# Patient Record
Sex: FEMALE | Race: WHITE | NOT HISPANIC OR LATINO
[De-identification: names, ages, dates, MRNs, and addresses within clinical notes are randomized per-mention and may not be internally consistent; named-entity substitution may affect disease eponyms.]

---

## 2017-01-11 ENCOUNTER — RX RENEWAL (OUTPATIENT)
Age: 37
End: 2017-01-11

## 2017-03-03 ENCOUNTER — LABORATORY RESULT (OUTPATIENT)
Age: 37
End: 2017-03-03

## 2017-04-18 ENCOUNTER — RESULT REVIEW (OUTPATIENT)
Age: 37
End: 2017-04-18

## 2017-04-24 ENCOUNTER — RESULT REVIEW (OUTPATIENT)
Age: 37
End: 2017-04-24

## 2017-05-09 ENCOUNTER — EMERGENCY (EMERGENCY)
Facility: HOSPITAL | Age: 37
LOS: 1 days | Discharge: HOME | End: 2017-05-09
Attending: EMERGENCY MEDICINE | Admitting: EMERGENCY MEDICINE
Payer: COMMERCIAL

## 2017-05-09 VITALS
DIASTOLIC BLOOD PRESSURE: 75 MMHG | HEART RATE: 81 BPM | SYSTOLIC BLOOD PRESSURE: 136 MMHG | RESPIRATION RATE: 16 BRPM | OXYGEN SATURATION: 100 %

## 2017-05-09 VITALS — TEMPERATURE: 98 F

## 2017-05-09 PROCEDURE — 99284 EMERGENCY DEPT VISIT MOD MDM: CPT

## 2017-05-09 RX ORDER — IBUPROFEN 200 MG
600 TABLET ORAL ONCE
Qty: 0 | Refills: 0 | Status: COMPLETED | OUTPATIENT
Start: 2017-05-09 | End: 2017-05-09

## 2017-05-09 RX ADMIN — Medication 600 MILLIGRAM(S): at 15:08
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
## 2017-05-09 NOTE — ED PROVIDER NOTE - MUSCULOSKELETAL, MLM
Spine appears normal, range of motion is not limited, no midline back or neck tenderness. +ttp of upper parathoracic region along the trapezius.
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
## 2017-06-09 ENCOUNTER — TRANSCRIPTION ENCOUNTER (OUTPATIENT)
Age: 37
End: 2017-06-09

## 2018-03-22 ENCOUNTER — APPOINTMENT (OUTPATIENT)
Dept: SPINE | Facility: CLINIC | Age: 38
End: 2018-03-22
Payer: COMMERCIAL

## 2018-03-22 VITALS
DIASTOLIC BLOOD PRESSURE: 78 MMHG | HEART RATE: 85 BPM | RESPIRATION RATE: 18 BRPM | SYSTOLIC BLOOD PRESSURE: 126 MMHG | TEMPERATURE: 98.6 F

## 2018-03-22 DIAGNOSIS — Z87.891 PERSONAL HISTORY OF NICOTINE DEPENDENCE: ICD-10-CM

## 2018-03-22 DIAGNOSIS — Z78.9 OTHER SPECIFIED HEALTH STATUS: ICD-10-CM

## 2018-03-22 PROCEDURE — 99203 OFFICE O/P NEW LOW 30 MIN: CPT

## 2018-04-06 ENCOUNTER — APPOINTMENT (OUTPATIENT)
Dept: DERMATOLOGY | Facility: CLINIC | Age: 38
End: 2018-04-06

## 2018-10-02 ENCOUNTER — TRANSCRIPTION ENCOUNTER (OUTPATIENT)
Age: 38
End: 2018-10-02

## 2019-03-21 ENCOUNTER — APPOINTMENT (OUTPATIENT)
Dept: MAMMOGRAPHY | Facility: CLINIC | Age: 39
End: 2019-03-21
Payer: COMMERCIAL

## 2019-03-21 ENCOUNTER — OUTPATIENT (OUTPATIENT)
Dept: OUTPATIENT SERVICES | Facility: HOSPITAL | Age: 39
LOS: 1 days | End: 2019-03-21
Payer: COMMERCIAL

## 2019-03-21 DIAGNOSIS — Z00.8 ENCOUNTER FOR OTHER GENERAL EXAMINATION: ICD-10-CM

## 2019-03-21 PROCEDURE — 77063 BREAST TOMOSYNTHESIS BI: CPT | Mod: 26

## 2019-03-21 PROCEDURE — 77067 SCR MAMMO BI INCL CAD: CPT

## 2019-03-21 PROCEDURE — 77063 BREAST TOMOSYNTHESIS BI: CPT

## 2019-03-21 PROCEDURE — 77067 SCR MAMMO BI INCL CAD: CPT | Mod: 26

## 2019-03-28 ENCOUNTER — APPOINTMENT (OUTPATIENT)
Dept: MAMMOGRAPHY | Facility: CLINIC | Age: 39
End: 2019-03-28

## 2019-03-28 ENCOUNTER — APPOINTMENT (OUTPATIENT)
Dept: ULTRASOUND IMAGING | Facility: CLINIC | Age: 39
End: 2019-03-28
Payer: COMMERCIAL

## 2019-03-28 ENCOUNTER — OUTPATIENT (OUTPATIENT)
Dept: OUTPATIENT SERVICES | Facility: HOSPITAL | Age: 39
LOS: 1 days | End: 2019-03-28
Payer: COMMERCIAL

## 2019-03-28 DIAGNOSIS — R92.2 INCONCLUSIVE MAMMOGRAM: ICD-10-CM

## 2019-03-28 DIAGNOSIS — Z00.8 ENCOUNTER FOR OTHER GENERAL EXAMINATION: ICD-10-CM

## 2019-03-28 PROCEDURE — 77065 DX MAMMO INCL CAD UNI: CPT

## 2019-03-28 PROCEDURE — 76642 ULTRASOUND BREAST LIMITED: CPT

## 2019-03-28 PROCEDURE — 76642 ULTRASOUND BREAST LIMITED: CPT | Mod: 26,RT

## 2019-03-28 PROCEDURE — G0279: CPT | Mod: 26

## 2019-03-28 PROCEDURE — 77065 DX MAMMO INCL CAD UNI: CPT | Mod: 26,RT

## 2019-03-28 PROCEDURE — G0279: CPT

## 2019-04-02 ENCOUNTER — RESULT REVIEW (OUTPATIENT)
Age: 39
End: 2019-04-02

## 2019-04-02 ENCOUNTER — OUTPATIENT (OUTPATIENT)
Dept: OUTPATIENT SERVICES | Facility: HOSPITAL | Age: 39
LOS: 1 days | End: 2019-04-02
Payer: COMMERCIAL

## 2019-04-02 ENCOUNTER — APPOINTMENT (OUTPATIENT)
Dept: MAMMOGRAPHY | Facility: CLINIC | Age: 39
End: 2019-04-02
Payer: COMMERCIAL

## 2019-04-02 DIAGNOSIS — R92.8 OTHER ABNORMAL AND INCONCLUSIVE FINDINGS ON DIAGNOSTIC IMAGING OF BREAST: ICD-10-CM

## 2019-04-02 PROCEDURE — 77065 DX MAMMO INCL CAD UNI: CPT | Mod: 26,RT

## 2019-04-02 PROCEDURE — A4648: CPT

## 2019-04-02 PROCEDURE — 88305 TISSUE EXAM BY PATHOLOGIST: CPT

## 2019-04-02 PROCEDURE — 88305 TISSUE EXAM BY PATHOLOGIST: CPT | Mod: 26

## 2019-04-02 PROCEDURE — 77065 DX MAMMO INCL CAD UNI: CPT

## 2019-04-02 PROCEDURE — 19081 BX BREAST 1ST LESION STRTCTC: CPT | Mod: RT

## 2019-04-02 PROCEDURE — 19081 BX BREAST 1ST LESION STRTCTC: CPT

## 2019-04-16 ENCOUNTER — TRANSCRIPTION ENCOUNTER (OUTPATIENT)
Age: 39
End: 2019-04-16

## 2019-08-01 ENCOUNTER — APPOINTMENT (OUTPATIENT)
Dept: PLASTIC SURGERY | Facility: CLINIC | Age: 39
End: 2019-08-01
Payer: COMMERCIAL

## 2019-08-01 VITALS — WEIGHT: 158 LBS | BODY MASS INDEX: 31.02 KG/M2 | HEIGHT: 60 IN

## 2019-08-01 DIAGNOSIS — Z78.9 OTHER SPECIFIED HEALTH STATUS: ICD-10-CM

## 2019-08-01 PROCEDURE — 99203 OFFICE O/P NEW LOW 30 MIN: CPT

## 2019-08-01 NOTE — ASSESSMENT
[FreeTextEntry1] : Patient is a 38-year-old female who presents with an enlarging mass on her left cheek. Patient was evaluated by Dermatology who recommended excision.\par \par I discussed the findings with patient, and treatment options. Agree with recommendation to excise the mass as it may continue growing or may get infected.  We discussed the planned procedure including alternatives, risks and potential complications which include but are not limited to infection, bleeding, recurrence, seroma, asymmetry, deformity, scarring, paresthesia, wound dehiscence. All questions were answered, and patient would like to proceed.\par \par We will schedule for surgery as an office procedure at our earliest mutual convenient time.

## 2019-08-01 NOTE — REASON FOR VISIT
[Consultation] : a consultation visit [FreeTextEntry1] : Pt presents here for an initial consultation at the request of Dr. Ham. Pt states she noticed cyst appear under left eye approx. 1 yr ago. Pt states it has been growing in size overtime. Pt states she has noticed discharge w/ a foul odor. Pt denies any bx, or imaging. Pt denies any pain, discomfort, irritation, or itchiness.

## 2019-08-01 NOTE — PHYSICAL EXAM
[de-identified] : The patient is ambulatory, well groomed, with no signs of cachexia.  [de-identified] : Normocephalic, atraumatic  [de-identified] : No conjuctival erythema, hyperemia, or discharge; No ectropion, entropion, lagophthalmos, or lid malposition\par Both pupils are equal, round, & reactive to light  [de-identified] : There are no masses, scars, or lesions of the external ear.  The external nose is midline with no scars or masses.  \par Gross hearing is intact bilaterally (finger rub).\par The nasal mucosa has no edema, lesions, or signs of desiccation. \par The lips and gums have no masses, lesions, friability or edema.  [de-identified] : The neck is soft without edema, masses, or crepitus.  The trachea is midline.  \par There is no thyromegaly or palpable thyroid nodules.  [de-identified] : The patient has no tachypnea or use of accessory respiratory muscles.  [de-identified] : The extremities have no swelling, tenderness, or varicosities.  \par On palpation, the extremities are warm with palpable pedal pulses  [de-identified] : There is no abdominal wall tenderness or masses with palpation.   \par No abdominal wall hernias are noted.  [de-identified] : The patient has normal gait and station.\par With inspection and palpation of digits and nails, there is no clubbing, ischemia, or infections noted.  [de-identified] : CN 2 - 12 are intact\par No sensory disturbances are noted (e.g., by touch)  [de-identified] : On examniation of the face, on the left medial cheek, under the lower eyelid/cheek junction, there is a palpable mass 1.5" with a punctum noted, no erythema or drainage, + tenderness\par There are no focal areas of alopecia in the scalp, eyebrows, face, chest, & extremities.\par There are no rashes of the head, neck, chest, abdomen, back, RUE, LUE, RLE, LLE  [de-identified] : The patient is alert and oriented to time, place, and person. \par There is no obvious disorder in mood or affect such as anxiety or agitation.

## 2019-08-01 NOTE — CONSULT LETTER
[Dear  ___] : Dear  [unfilled], [Consult Letter:] : I had the pleasure of evaluating your patient, [unfilled]. [( Thank you for referring [unfilled] for consultation for _____ )] : Thank you for referring [unfilled] for consultation for [unfilled] [Please see my note below.] : Please see my note below. [Sincerely,] : Sincerely, [Consult Closing:] : Thank you very much for allowing me to participate in the care of this patient.  If you have any questions, please do not hesitate to contact me. [FreeTextEntry3] : Tavo Bolaños MD

## 2019-09-05 ENCOUNTER — LABORATORY RESULT (OUTPATIENT)
Age: 39
End: 2019-09-05

## 2019-09-05 ENCOUNTER — APPOINTMENT (OUTPATIENT)
Dept: PLASTIC SURGERY | Facility: CLINIC | Age: 39
End: 2019-09-05
Payer: COMMERCIAL

## 2019-09-05 PROCEDURE — 14040 TIS TRNFR F/C/C/M/N/A/G/H/F: CPT

## 2019-09-05 PROCEDURE — 21011 EXC FACE LES SC <2 CM: CPT

## 2019-09-12 ENCOUNTER — APPOINTMENT (OUTPATIENT)
Dept: PLASTIC SURGERY | Facility: CLINIC | Age: 39
End: 2019-09-12
Payer: COMMERCIAL

## 2019-09-12 PROCEDURE — 99024 POSTOP FOLLOW-UP VISIT: CPT

## 2019-09-17 NOTE — HISTORY OF PRESENT ILLNESS
[FreeTextEntry1] : 40 yo F s/p excisional biopsy of the left cheek mass DOS: 09/05/19. Doing well, denies much discomfort. Dressings fell off. Otherwise no other complaints or concerns; denies fever, sweats, or chills.\par

## 2019-09-17 NOTE — PHYSICAL EXAM
[de-identified] : Left Cheek -\par Incision is well healed, sutures were removed today, no erythema, no gaps, no drainage noted. No sign of active infection.

## 2019-09-17 NOTE — ASSESSMENT
[FreeTextEntry1] : 38 yo F s/p excisional biopsy of the left cheek mass DOS: 09/05/19.\par \par Pt doing well; incision is well healed\par - Pathology results were reviewed w/ the patient today.\par - The patient was encouraged to massage the incision site 2-3 times per day for 8-10 minutes with lotion, vitamin E, or cocoa butter to encourage blood flow and to decrease scar tissue formation.\par - Sun protection recommended.\par - Follow up PRN.

## 2019-10-15 ENCOUNTER — TRANSCRIPTION ENCOUNTER (OUTPATIENT)
Age: 39
End: 2019-10-15

## 2019-11-20 ENCOUNTER — APPOINTMENT (OUTPATIENT)
Dept: CARDIOLOGY | Facility: CLINIC | Age: 39
End: 2019-11-20
Payer: COMMERCIAL

## 2019-11-20 ENCOUNTER — NON-APPOINTMENT (OUTPATIENT)
Age: 39
End: 2019-11-20

## 2019-11-20 VITALS
HEIGHT: 60 IN | BODY MASS INDEX: 32 KG/M2 | OXYGEN SATURATION: 97 % | WEIGHT: 163 LBS | RESPIRATION RATE: 18 BRPM | DIASTOLIC BLOOD PRESSURE: 80 MMHG | SYSTOLIC BLOOD PRESSURE: 120 MMHG | HEART RATE: 85 BPM

## 2019-11-20 DIAGNOSIS — R00.2 PALPITATIONS: ICD-10-CM

## 2019-11-20 DIAGNOSIS — Z82.49 FAMILY HISTORY OF ISCHEMIC HEART DISEASE AND OTHER DISEASES OF THE CIRCULATORY SYSTEM: ICD-10-CM

## 2019-11-20 PROCEDURE — 93000 ELECTROCARDIOGRAM COMPLETE: CPT

## 2019-11-20 PROCEDURE — 99204 OFFICE O/P NEW MOD 45 MIN: CPT

## 2019-11-20 NOTE — HISTORY OF PRESENT ILLNESS
[FreeTextEntry1] : 39 year old woman with a history cervical disease presents for an initial cardiac evaluation. \par \par Recently she was complaining of a midsternal chest pain that was sharp, nonradiating, nonexertional, associated with nausea. The pain persistent for 30 minutes and she went to [facility]. Her workup  was normal and discharged. \par \par She has been complaining of a fluttering that happens 2 times a weeks for seconds, usually happens at rest. \par She has been under more stress recently. Recently she has been stressed and had a panic attack. \par  She   denies any  PND, orthopnea, lower extremity edema, near syncope, syncope, strokelike symptoms. \par She had a been more sedentary given her spinal disease but now is starting to go for [employer].

## 2019-11-20 NOTE — PHYSICAL EXAM
[Well Groomed] : well groomed [General Appearance - In No Acute Distress] : no acute distress [Normal Conjunctiva] : the conjunctiva exhibited no abnormalities [Eyelids - No Xanthelasma] : the eyelids demonstrated no xanthelasmas [Normal Oral Mucosa] : normal oral mucosa [No Oral Pallor] : no oral pallor [No Oral Cyanosis] : no oral cyanosis [Normal Jugular Venous V Waves Present] : normal jugular venous V waves present [Normal Jugular Venous A Waves Present] : normal jugular venous A waves present [No Jugular Venous Tejada A Waves] : no jugular venous tejada A waves [Rhythm Regular] : regular [Normal Rate] : normal [Normal S1] : normal S1 [Normal S2] : normal S2 [No Murmur] : no murmurs heard [Right Carotid Bruit] : no bruit heard over the right carotid [Left Carotid Bruit] : no bruit heard over the left carotid [2+] : left 2+ [Bruit] : no bruit heard [No Pitting Edema] : no pitting edema present [Auscultation Breath Sounds / Voice Sounds] : lungs were clear to auscultation bilaterally [Respiration, Rhythm And Depth] : normal respiratory rhythm and effort [Exaggerated Use Of Accessory Muscles For Inspiration] : no accessory muscle use [Abdomen Soft] : soft [Abdomen Tenderness] : non-tender [Abdomen Mass (___ Cm)] : no abdominal mass palpated [Abnormal Walk] : normal gait [Gait - Sufficient For Exercise Testing] : the gait was sufficient for exercise testing [Nail Clubbing] : no clubbing of the fingernails [Cyanosis, Localized] : no localized cyanosis [Petechial Hemorrhages (___cm)] : no petechial hemorrhages [Skin Color & Pigmentation] : normal skin color and pigmentation [] : no rash [No Venous Stasis] : no venous stasis [Skin Lesions] : no skin lesions [No Skin Ulcers] : no skin ulcer [No Xanthoma] : no  xanthoma was observed [Mood] : the mood was normal [Affect] : the affect was normal [Oriented To Time, Place, And Person] : oriented to person, place, and time [No Anxiety] : not feeling anxious

## 2019-11-20 NOTE — DISCUSSION/SUMMARY
[FreeTextEntry1] : 39 year woman with a history as listed presents for an initial cardiac evaluation. \par Claudia had a bout nonanginal chest. Her EKG did not reveal any significant ischemic changes. \par She will undergo a treadmill exercise stress test to define exercise tolerance, rule out exertional hypertensive responses, assess for exercise induced arrhythmias and rule out ischemia from obstructive CAD. She will get a 2d echo to assess for any  new structural heart disease, changes in valvular and ventricular function. \par Her palpitations appear to be related to possible mild ectopy. If it increases in frequency then I will send her monitoring. \par Exercise and diet counseling was performed in order to reduce her future cardiovascular risk.\par She will have her baseline lab work, including lipids, done prior to the next visit. \par She will followup with me in 12 months or sooner if necessary. \par

## 2019-11-22 ENCOUNTER — NON-APPOINTMENT (OUTPATIENT)
Age: 39
End: 2019-11-22

## 2019-11-22 ENCOUNTER — APPOINTMENT (OUTPATIENT)
Dept: CARDIOLOGY | Facility: CLINIC | Age: 39
End: 2019-11-22
Payer: COMMERCIAL

## 2019-12-03 NOTE — PROCEDURE
[FreeTextEntry2] : Excisional biopsy left cheek mass 2cm ,and local flap <10sqcm [Nl] : None [FreeTextEntry1] : Left cheek mass [FreeTextEntry6] : After the area was prepped and draped, the area was infiltrated with 1%lidocaine with epi. The mass in question was marked. Incision was made around it and the mass was dissected circumferentially with the overlying skin. It measured 2cm. This was sent to pathology.\par \par The wound was then irrigated, and given size and location of the wound, a local flap was marked. The flap was incised and elevated. The flap was then advanced into the area providing a good reconstruction. The flap was then inset with 4-0 monocryl for the dermis and 5-0 nylon for the skin. The total area including the wound was less than 10sq cm. A dressing was applied. Pt tolerated well procedure.\par  [FreeTextEntry7] : left cheek mass

## 2019-12-03 NOTE — REASON FOR VISIT
[Procedure: _________] : a [unfilled] procedure visit [FreeTextEntry1] : excisional biopsy and closure of left cheek mass.

## 2019-12-05 PROCEDURE — 93224 XTRNL ECG REC UP TO 48 HRS: CPT

## 2019-12-09 ENCOUNTER — APPOINTMENT (OUTPATIENT)
Dept: NEUROLOGY | Facility: CLINIC | Age: 39
End: 2019-12-09

## 2019-12-11 ENCOUNTER — APPOINTMENT (OUTPATIENT)
Dept: CARDIOLOGY | Facility: CLINIC | Age: 39
End: 2019-12-11
Payer: COMMERCIAL

## 2019-12-11 PROCEDURE — 93306 TTE W/DOPPLER COMPLETE: CPT

## 2019-12-13 ENCOUNTER — TRANSCRIPTION ENCOUNTER (OUTPATIENT)
Age: 39
End: 2019-12-13

## 2019-12-19 ENCOUNTER — APPOINTMENT (OUTPATIENT)
Dept: CARDIOLOGY | Facility: CLINIC | Age: 39
End: 2019-12-19

## 2020-02-28 ENCOUNTER — APPOINTMENT (OUTPATIENT)
Dept: ORTHOPEDIC SURGERY | Facility: CLINIC | Age: 40
End: 2020-02-28
Payer: COMMERCIAL

## 2020-02-28 DIAGNOSIS — M62.830 MUSCLE SPASM OF BACK: ICD-10-CM

## 2020-02-28 PROCEDURE — 99203 OFFICE O/P NEW LOW 30 MIN: CPT | Mod: 25

## 2020-02-28 PROCEDURE — 20553 NJX 1/MLT TRIGGER POINTS 3/>: CPT | Mod: LT

## 2020-02-28 NOTE — HISTORY OF PRESENT ILLNESS
[de-identified] : Patient is here for left shoulder pain. This has been a chronic issue. She has been able to maintain with PT, exercise, massage therapy, muscle relaxers. She had an exacerbation about a  week ago. She used to receive trigger point injections which would provide relief.

## 2020-02-28 NOTE — PHYSICAL EXAM
[de-identified] : Constitutional: Well-nourished, well-developed, No acute distress\par Respiratory:  Good respiratory effort, no SOB\par Lymphatic: No regional lymphadenopathy, no lymphedema\par Psychiatric: Pleasant and normal affect, alert and oriented x3\par Skin: Clean dry and intact B/L UE/LE\par Musculoskeletal: normal except where as noted in regional exam\par \par Cervical Spine Exam\par APPEARANCE: no marked deformities or malalignment, normal curvature, good posture\par POSITIVE TENDERNESS: + Left upper trapezius, levator scapula, rhomboid major, and rhomboid minor, + spasm noted in the same muscles.\par NONTENDER: no bony midline tenderness.\par ROM: limited in all planes, most notably in flexion and sidebending due to pain\par RESISTIVE TESTING: painful 4/5 resisted ext, bilateral sidebending, rotation and shoulder shrug , 5/5 flexion \par SPECIAL TESTS: neg Spurling's b/l\par Vasc: 2+ radial pulse b/l\par Neuro: C5 - T1 intact to motor, DTRs 2+/4 biceps, triceps, brachioradialis\par Sensation: Intact to light touch throughout b/l UE\par \par Thoracic Spine Exam:\par \par normal curvature and normal alignment. good posture. no midline bony tenderness, + marked spasm and associated tenderness of left paraspinal and periscapular muscles.  ROM mildly limited in sidebending and rotation due to noted spasm\par \par B/L Shoulders:  No asymmetry, malalignment, or swelling, Full ROM, 5/5 strength in flexion/ext, Abd/Add, IR/ER, Joints stable\par B/L Elbows:  No asymmetry, malalignment, or swelling, Full ROM, 5/5 strength in flexion/ext, pronation/supination, Joints stable\par B/L Wrist and Hand:  No asymmetry, malalignment, or swelling, Full ROM, 5/5 strength in wrist and long finger flexion/ext, radial/ulnar deviation, Joints stable\par \par

## 2020-02-28 NOTE — PROCEDURE
[de-identified] : Injection: Trigger Point Injection. \par Indication: Myofascial trigger point of the right quadratus lumborum, iliocostalis, longissimus, and spinalis.  \par \par A discussion was had with the patient regarding this procedure and all questions were answered. All risks, benefits and alternatives were discussed. These included but were not limited to bleeding, infection, allergic reaction, and possibility of pneumothorax. A timeout was done to ensure correct side and location of identified trigger points and pt agreed to the procedure. Alcohol was used to clean the skin. Ethyl chloride spray was then used as a topical anesthetic. A 5cc syringe was filled with 4cc of 0.25% bupivacaine and 1cc of 40mg/ml methylprednisolone. A 25-gauge 1.5" needle was used to inject 2.5cc into the trigger points. A sterile bandage was then applied. The patient tolerated the procedure well and there were no complications.

## 2020-02-28 NOTE — DISCUSSION/SUMMARY
[de-identified] : Discussed findings of today's exam and possible causes of patient's pain.  Educated patient on their most probable diagnosis of left cervicothoracic back pain with myofascial spasm.  Reviewed possible courses of treatment, and we collaboratively decided best course of treatment at this time will include myofascial trigger point cortisone injection today (see procedure note).  Informed the patient that the numbing medicine in today's injection will last for about 4-6 hours. The steroid that was injected will start to work in 1 to 2 days, peak at 1-2 weeks, and may last up to 1-2 months. Patient will continue with massage therapy and home stretching/exercise program as tolerated. May consider formal course of physical therapy she has persisting pain.  Follow up as needed.  Patient appreciates and agrees with current plan.\par \par This note was generated using dragon medical dictation software.  A reasonable effort has been made for proofreading its contents, but typos may still remain.  If there are any questions or points of clarification needed please notify my office.

## 2020-03-09 ENCOUNTER — APPOINTMENT (OUTPATIENT)
Dept: CARDIOLOGY | Facility: CLINIC | Age: 40
End: 2020-03-09

## 2020-04-21 ENCOUNTER — APPOINTMENT (OUTPATIENT)
Dept: NEUROLOGY | Facility: CLINIC | Age: 40
End: 2020-04-21

## 2020-04-25 ENCOUNTER — MESSAGE (OUTPATIENT)
Age: 40
End: 2020-04-25

## 2020-05-06 ENCOUNTER — APPOINTMENT (OUTPATIENT)
Dept: DISASTER EMERGENCY | Facility: CLINIC | Age: 40
End: 2020-05-06

## 2020-05-07 LAB
SARS-COV-2 IGG SERPL IA-ACNC: 0.3 RATIO
SARS-COV-2 IGG SERPL QL IA: NEGATIVE

## 2020-09-15 NOTE — ED PROVIDER NOTE - SKIN, MLM
Skin normal color for race, warm, dry and intact. No evidence of abrasions, lacerations or ecchymosis.
Spouse/Transport

## 2021-04-21 ENCOUNTER — APPOINTMENT (OUTPATIENT)
Dept: DERMATOLOGY | Facility: CLINIC | Age: 41
End: 2021-04-21
Payer: COMMERCIAL

## 2021-04-21 ENCOUNTER — LABORATORY RESULT (OUTPATIENT)
Age: 41
End: 2021-04-21

## 2021-04-21 ENCOUNTER — NON-APPOINTMENT (OUTPATIENT)
Age: 41
End: 2021-04-21

## 2021-04-21 VITALS — WEIGHT: 160 LBS | BODY MASS INDEX: 31.41 KG/M2 | HEIGHT: 60 IN

## 2021-04-21 DIAGNOSIS — L98.8 OTHER SPECIFIED DISORDERS OF THE SKIN AND SUBCUTANEOUS TISSUE: ICD-10-CM

## 2021-04-21 DIAGNOSIS — D22.9 MELANOCYTIC NEVI, UNSPECIFIED: ICD-10-CM

## 2021-04-21 DIAGNOSIS — D48.5 NEOPLASM OF UNCERTAIN BEHAVIOR OF SKIN: ICD-10-CM

## 2021-04-21 PROCEDURE — 11104 PUNCH BX SKIN SINGLE LESION: CPT

## 2021-04-21 PROCEDURE — 99072 ADDL SUPL MATRL&STAF TM PHE: CPT

## 2021-04-21 PROCEDURE — 99203 OFFICE O/P NEW LOW 30 MIN: CPT | Mod: 25

## 2021-04-22 PROBLEM — D22.9 ACQUIRED MELANOCYTIC NEVUS: Status: ACTIVE | Noted: 2021-04-22

## 2021-04-22 PROBLEM — D48.5 NEOPLASM OF UNCERTAIN BEHAVIOR OF SKIN: Status: ACTIVE | Noted: 2021-04-22

## 2021-04-22 PROBLEM — L98.8 SKIN MACULE: Status: ACTIVE | Noted: 2021-04-22

## 2021-04-30 ENCOUNTER — RESULT REVIEW (OUTPATIENT)
Age: 41
End: 2021-04-30

## 2021-05-04 ENCOUNTER — APPOINTMENT (OUTPATIENT)
Dept: DERMATOLOGY | Facility: CLINIC | Age: 41
End: 2021-05-04
Payer: COMMERCIAL

## 2021-05-04 DIAGNOSIS — D23.9 OTHER BENIGN NEOPLASM OF SKIN, UNSPECIFIED: ICD-10-CM

## 2021-05-04 PROCEDURE — 99212 OFFICE O/P EST SF 10 MIN: CPT | Mod: GC

## 2021-05-04 PROCEDURE — 99072 ADDL SUPL MATRL&STAF TM PHE: CPT

## 2021-06-07 ENCOUNTER — RESULT REVIEW (OUTPATIENT)
Age: 41
End: 2021-06-07

## 2021-06-07 ENCOUNTER — APPOINTMENT (OUTPATIENT)
Dept: NEUROLOGY | Facility: CLINIC | Age: 41
End: 2021-06-07
Payer: COMMERCIAL

## 2021-06-07 VITALS
DIASTOLIC BLOOD PRESSURE: 96 MMHG | HEART RATE: 81 BPM | BODY MASS INDEX: 32.59 KG/M2 | WEIGHT: 166 LBS | HEIGHT: 60 IN | SYSTOLIC BLOOD PRESSURE: 152 MMHG

## 2021-06-07 PROCEDURE — 99072 ADDL SUPL MATRL&STAF TM PHE: CPT

## 2021-06-07 PROCEDURE — 99205 OFFICE O/P NEW HI 60 MIN: CPT
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
## 2021-06-07 NOTE — PHYSICAL EXAM
[General Appearance - Alert] : alert [General Appearance - In No Acute Distress] : in no acute distress [Oriented To Time, Place, And Person] : oriented to person, place, and time [Impaired Insight] : insight and judgment were intact [Affect] : the affect was normal [Person] : oriented to person [Place] : oriented to place [Time] : oriented to time [Concentration Intact] : normal concentrating ability [Visual Intact] : visual attention was ~T not ~L decreased [Naming Objects] : no difficulty naming common objects [Repeating Phrases] : no difficulty repeating a phrase [Writing A Sentence] : no difficulty writing a sentence [Fluency] : fluency intact [Comprehension] : comprehension intact [Reading] : reading intact [Past History] : adequate knowledge of personal past history [Date / Time ___ / 5] : date / time [unfilled] / 5 [Place ___ / 5] : place [unfilled] / 5 [Registration ___ / 3] : registration [unfilled] / 3 [Serial Sevens ___/5] : serial sevens [unfilled] / 5 [Naming 2 Objects ___ / 2] : naming two objects [unfilled] / 2 [Repeating a Sentence ___ / 1] : repeating a sentence [unfilled] / 1 [Writing a Sentence ___ / 1] : write sentence [unfilled] / 1 [3-stage Verbal Command ___ / 3] : three-stage verbal command [unfilled] / 3 [Written Command ___ / 1] : written command [unfilled] / 1 [Cranial Nerves Optic (II)] : visual acuity intact bilaterally,  visual fields full to confrontation, pupils equal round and reactive to light [Cranial Nerves Oculomotor (III)] : extraocular motion intact [Cranial Nerves Trigeminal (V)] : facial sensation intact symmetrically [Cranial Nerves Facial (VII)] : face symmetrical [Cranial Nerves Vestibulocochlear (VIII)] : hearing was intact bilaterally [Cranial Nerves Glossopharyngeal (IX)] : tongue and palate midline [Cranial Nerves Accessory (XI - Cranial And Spinal)] : head turning and shoulder shrug symmetric [Cranial Nerves Hypoglossal (XII)] : there was no tongue deviation with protrusion [Motor Strength] : muscle strength was normal in all four extremities [Involuntary Movements] : no involuntary movements were seen [No Muscle Atrophy] : normal bulk in all four extremities [Motor Handedness Right-Handed] : the patient is right hand dominant [Sensation Tactile Decrease] : light touch was intact [Sensation Pain / Temperature Decrease] : pain and temperature was intact [Sensation Vibration Decrease] : vibration was intact [Proprioception] : proprioception was intact [Balance] : balance was intact [2+] : Ankle jerk left 2+ [Sclera] : the sclera and conjunctiva were normal [PERRL With Normal Accommodation] : pupils were equal in size, round, reactive to light, with normal accommodation [Extraocular Movements] : extraocular movements were intact [Optic Disc Abnormality] : the optic disc were normal in size and color [No APD] : no afferent pupillary defect [No YESSY] : no internuclear ophthalmoplegia [Full Visual Field] : full visual field [Outer Ear] : the ears and nose were normal in appearance [Oropharynx] : the oropharynx was normal [Neck Appearance] : the appearance of the neck was normal [Neck Cervical Mass (___cm)] : no neck mass was observed [Jugular Venous Distention Increased] : there was no jugular-venous distention [Thyroid Diffuse Enlargement] : the thyroid was not enlarged [Thyroid Nodule] : there were no palpable thyroid nodules [Auscultation Breath Sounds / Voice Sounds] : lungs were clear to auscultation bilaterally [Heart Rate And Rhythm] : heart rate was normal and rhythm regular [Heart Sounds] : normal S1 and S2 [Heart Sounds Gallop] : no gallops [Murmurs] : no murmurs [Heart Sounds Pericardial Friction Rub] : no pericardial rub [Arterial Pulses Carotid] : carotid pulses were normal with no bruits [Full Pulse] : the pedal pulses are present [Edema] : there was no peripheral edema [Bowel Sounds] : normal bowel sounds [Abdomen Soft] : soft [Abdomen Tenderness] : non-tender [Abdomen Mass (___ Cm)] : no abdominal mass palpated [No CVA Tenderness] : no ~M costovertebral angle tenderness [No Spinal Tenderness] : no spinal tenderness [Abnormal Walk] : normal gait [Nail Clubbing] : no clubbing  or cyanosis of the fingernails [Musculoskeletal - Swelling] : no joint swelling seen [Motor Tone] : muscle strength and tone were normal [Skin Color & Pigmentation] : normal skin color and pigmentation [Skin Turgor] : normal skin turgor [] : no rash [Motor Strength Upper Extremities Bilaterally] : strength was normal in both upper extremities [Motor Strength Lower Extremities Bilaterally] : strength was normal in both lower extremities [Romberg's Sign] : Romberg's sign was negtive [Allodynia] : no ~T allodynia present [Dysesthesia] : no dysesthesia [Hyperesthesia] : no hyperesthesia [Past-pointing] : there was no past-pointing [Tremor] : no tremor present [Plantar Reflex Right Only] : normal on the right [Plantar Reflex Left Only] : normal on the left [FreeTextEntry4] : Mental Status Exam\par Presidents: 5/5\par Alternating Pattern: ok\par Spiral: ok\par Clock: 3/3\par Repetition: ok \par \par Trail A: 25"; B: 40"\par Fluency: A: >15/min; Animals: >20/min\par \par Go-No-Go: ok\par \par R/L discrimination on self and examiner: ok\par Cross-line commands: ok\par Praxis: \par -Motor: ok\par -Dynamic/Luria: ok\par -Ideomotor/Imitation: ok\par -Ideational/writing/closing-in: ok\par -Dressing: ok.\par

## 2021-06-07 NOTE — ASSESSMENT
[FreeTextEntry1] : Assessment:\par 39yo RH HW with above PMH.\par Testing is benign.\par Last MRI in 2015, but best to repeat.\par Anxiety may play a role.\par \par Diagnostic Impression:\par -forgetfulness\par -anxiety\par \par Plan:\par Rule out reversible and vascular causes:\par -B vitamins, TFT, RPR\par -UC carotids and TCD\par -MRI brain w/o tim\par -basic inflammatory labs\par -Lyme serology\par -NPT\par \par \par A thorough discussion was entertained with the patient/caregiver regarding the use of psychoactive medications, their possible benefits and AE profile, including the risk of cardiovascular complications, including but not limited to applicable black box warning and teratogenicity, where appropriate.\par We discussed the benefits of being active, physically and mentally, and the need to to establish a routine in this respect.\par Driving abilities and firearms possession and use were discussed, in relation to progression of the cognitive decline, and the need to assess them periodically.\par Patient/caregiver advised to bring previous records to this office.\par All questions were answered at the time of the visit. We are certainly available for further discussion as needed.\par Patient/caregiver fully understands and agree with the plan.\par

## 2021-06-07 NOTE — DATA REVIEWED
[de-identified] : EXAM: MR BRAIN WAW IC \par PROCEDURE DATE: Feb 13 2015 \par INTERPRETATION: History: Dizziness. Headaches. Visual distortion. \par Technique: MRI of the brain was performed with and without contrast \par Sagittal and axial T1, axial T2, FLAIR, SWI, diffusion-weighted images and \par an ADC map were obtained.Contrast enhanced axial T1, T1 BRAVO sequences were \par obtained. Coronal and sagittal reformations were made from axial BRAVO \par imaging. \par 7 mls of Gadavist was administered intravenously without complication and 3 \par mls were discarded. \par COMPARISON: None. \par FINDINGS: \par The ventricles and sulci are within normal limits. There are no areas of \par abnormal signal, susceptibility or enhancement within the brain parenchyma. \par There is no intraparenchymal hematoma, mass effect or midline shift. No \par abnormal extra-axial fluid collections are present. There is no diffusion \par abnormality to suggest acute or subacute infarction. Major flow-voids at the \par base of the brain follow expected course and contour. \par The calvarium is intact. The visualized intraorbital compartments, paranasal \par sinuses and tympanomastoid cavities appear free of acute disease. \par IMPRESSION: Normal contrast enhanced brain MRI. \par MER COREA M.D., ATTENDING RADIOLOGIST \par This examination was interpreted on: Feb 13 2015 11:34AM. This document \par has been electronically signed. Feb 13 2015 11:46AM

## 2021-06-11 ENCOUNTER — NON-APPOINTMENT (OUTPATIENT)
Age: 41
End: 2021-06-11

## 2021-06-11 ENCOUNTER — LABORATORY RESULT (OUTPATIENT)
Age: 41
End: 2021-06-11

## 2021-06-11 ENCOUNTER — APPOINTMENT (OUTPATIENT)
Dept: INTERNAL MEDICINE | Facility: CLINIC | Age: 41
End: 2021-06-11
Payer: COMMERCIAL

## 2021-06-11 VITALS — SYSTOLIC BLOOD PRESSURE: 112 MMHG | DIASTOLIC BLOOD PRESSURE: 72 MMHG

## 2021-06-11 VITALS
HEART RATE: 96 BPM | TEMPERATURE: 97.8 F | OXYGEN SATURATION: 98 % | WEIGHT: 163 LBS | BODY MASS INDEX: 32 KG/M2 | HEIGHT: 60 IN

## 2021-06-11 DIAGNOSIS — Z83.438 FAMILY HISTORY OF OTHER DISORDER OF LIPOPROTEIN METABOLISM AND OTHER LIPIDEMIA: ICD-10-CM

## 2021-06-11 DIAGNOSIS — Z82.49 FAMILY HISTORY OF ISCHEMIC HEART DISEASE AND OTHER DISEASES OF THE CIRCULATORY SYSTEM: ICD-10-CM

## 2021-06-11 DIAGNOSIS — Z80.8 FAMILY HISTORY OF MALIGNANT NEOPLASM OF OTHER ORGANS OR SYSTEMS: ICD-10-CM

## 2021-06-11 DIAGNOSIS — L70.9 ACNE, UNSPECIFIED: ICD-10-CM

## 2021-06-11 PROCEDURE — 99386 PREV VISIT NEW AGE 40-64: CPT | Mod: 25

## 2021-06-11 PROCEDURE — 93000 ELECTROCARDIOGRAM COMPLETE: CPT

## 2021-06-11 PROCEDURE — 99072 ADDL SUPL MATRL&STAF TM PHE: CPT

## 2021-06-11 NOTE — PHYSICAL EXAM
[No Carotid Bruits] : no carotid bruits [No Varicosities] : no varicosities [No Edema] : there was no peripheral edema [No Extremity Clubbing/Cyanosis] : no extremity clubbing/cyanosis [Normal Appearance] : normal in appearance [No Masses] : no palpable masses [No Nipple Discharge] : no nipple discharge [No Axillary Lymphadenopathy] : no axillary lymphadenopathy [Normal] : affect was normal and insight and judgment were intact [de-identified] : +acne back
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
## 2021-06-11 NOTE — PAST MEDICAL HISTORY
[Menstruating] : menstruating [Definite ___ (Date)] : the last menstrual period was [unfilled] [Regular Cycle Intervals] : have been regular [Total Preg ___] : G[unfilled] [Live Births ___] : P[unfilled]  [Full Term ___] : Full Term: [unfilled] [Abortions ___] : Abortions:[unfilled] [FreeTextEntry1] : q21-24 days, last 4-5 days

## 2021-06-11 NOTE — HEALTH RISK ASSESSMENT
[Very Good] : ~his/her~  mood as very good [] : Yes [6-10] : 6-10 [Yes] : Yes [2 - 4 times a month (2 pts)] : 2-4 times a month (2 points) [1 or 2 (0 pts)] : 1 or 2 (0 points) [Never (0 pts)] : Never (0 points) [No falls in past year] : Patient reported no falls in the past year [Patient reported PAP Smear was normal] : Patient reported PAP Smear was normal [HIV test declined] : HIV test declined [Hepatitis C test declined] : Hepatitis C test declined [None] : None [With Family] : lives with family [Employed] : employed [Single] : single [Sexually Active] : sexually active [Feels Safe at Home] : Feels safe at home [Fully functional (bathing, dressing, toileting, transferring, walking, feeding)] : Fully functional (bathing, dressing, toileting, transferring, walking, feeding) [Fully functional (using the telephone, shopping, preparing meals, housekeeping, doing laundry, using] : Fully functional and needs no help or supervision to perform IADLs (using the telephone, shopping, preparing meals, housekeeping, doing laundry, using transportation, managing medications and managing finances) [Reports changes in hearing] : Reports changes in hearing [Reports normal functional visual acuity (ie: able to read med bottle)] : Reports normal functional visual acuity [Smoke Detector] : smoke detector [Carbon Monoxide Detector] : carbon monoxide detector [Safety elements used in home] : safety elements used in home [Seat Belt] :  uses seat belt [Sunscreen] : uses sunscreen [YearQuit] : 2012 [Audit-CScore] : 2 [de-identified] : as above [de-identified] : as above [Change in mental status noted] : No change in mental status noted [Language] : denies difficulty with language [Behavior] : denies difficulty with behavior [Learning/Retaining New Information] : denies difficulty learning/retaining new information [Handling Complex Tasks] : denies difficulty handling complex tasks [Reasoning] : denies difficulty with reasoning [Spatial Ability and Orientation] : denies difficulty with spatial ability and orientation [High Risk Behavior] : no high risk behavior [Reports changes in vision] : Reports no changes in vision [Reports changes in dental health] : Reports no changes in dental health [TB Exposure] : is not being exposed to tuberculosis [MammogramDate] : 2020 [MammogramComments] : has Rx for mammogram from gynecologist [PapSmearDate] : 04/2021 [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

## 2021-06-11 NOTE — REVIEW OF SYSTEMS
[Negative] : Heme/Lymph [Headache] : no headache [Dizziness] : no dizziness [Fainting] : no fainting [Confusion] : no confusion

## 2021-06-11 NOTE — PLAN
[FreeTextEntry1] : #Obesity: we discussed exercise, diet. Motivated to lose weight and did successfully lose weight earlier in the year. She plans to start meal plan delivery to assist with portion control/food choices, and will be rejoining gym soon. Encouraged her to do at least 30 min 5 d/week of cardiovascular exercise. \par Fasting labwork ordered, will followup\par Forgetfullness, headaches: has workup/followup with neurologist\par Mammogram: has rx from gyn\par COVID19 vaccine: disucssed, at this time she wishes to defer. To continue mask wearing/social distancing\par TdaP: pt will check her records\par F/u OV 6m prn

## 2021-06-17 LAB
APPEARANCE: ABNORMAL
BILIRUBIN URINE: NEGATIVE
BLOOD URINE: NEGATIVE
COLOR: NORMAL
GLUCOSE QUALITATIVE U: NEGATIVE
KETONES URINE: NEGATIVE
LEUKOCYTE ESTERASE URINE: NEGATIVE
NITRITE URINE: NEGATIVE
PH URINE: 6.5
PROTEIN URINE: NEGATIVE
SPECIFIC GRAVITY URINE: 1.02
UROBILINOGEN URINE: NORMAL

## 2021-07-22 ENCOUNTER — OUTPATIENT (OUTPATIENT)
Dept: OUTPATIENT SERVICES | Facility: HOSPITAL | Age: 41
LOS: 1 days | End: 2021-07-22
Payer: COMMERCIAL

## 2021-07-22 ENCOUNTER — APPOINTMENT (OUTPATIENT)
Dept: MAMMOGRAPHY | Facility: CLINIC | Age: 41
End: 2021-07-22
Payer: COMMERCIAL

## 2021-07-22 ENCOUNTER — APPOINTMENT (OUTPATIENT)
Dept: ULTRASOUND IMAGING | Facility: CLINIC | Age: 41
End: 2021-07-22
Payer: COMMERCIAL

## 2021-07-22 ENCOUNTER — APPOINTMENT (OUTPATIENT)
Dept: MRI IMAGING | Facility: CLINIC | Age: 41
End: 2021-07-22
Payer: COMMERCIAL

## 2021-07-22 DIAGNOSIS — Z00.8 ENCOUNTER FOR OTHER GENERAL EXAMINATION: ICD-10-CM

## 2021-07-22 PROCEDURE — 77066 DX MAMMO INCL CAD BI: CPT | Mod: 26

## 2021-07-22 PROCEDURE — 70551 MRI BRAIN STEM W/O DYE: CPT | Mod: 26

## 2021-07-22 PROCEDURE — 76641 ULTRASOUND BREAST COMPLETE: CPT

## 2021-07-22 PROCEDURE — 76641 ULTRASOUND BREAST COMPLETE: CPT | Mod: 26,50

## 2021-07-22 PROCEDURE — G0279: CPT | Mod: 26

## 2021-07-22 PROCEDURE — 70551 MRI BRAIN STEM W/O DYE: CPT

## 2021-07-22 PROCEDURE — 77066 DX MAMMO INCL CAD BI: CPT

## 2021-07-22 PROCEDURE — G0279: CPT

## 2021-07-23 ENCOUNTER — OUTPATIENT (OUTPATIENT)
Dept: OUTPATIENT SERVICES | Facility: HOSPITAL | Age: 41
LOS: 1 days | End: 2021-07-23

## 2021-07-23 VITALS
OXYGEN SATURATION: 99 % | TEMPERATURE: 99 F | SYSTOLIC BLOOD PRESSURE: 118 MMHG | WEIGHT: 166.01 LBS | RESPIRATION RATE: 16 BRPM | HEIGHT: 60 IN | HEART RATE: 75 BPM | DIASTOLIC BLOOD PRESSURE: 78 MMHG

## 2021-07-23 DIAGNOSIS — Z30.09 ENCOUNTER FOR OTHER GENERAL COUNSELING AND ADVICE ON CONTRACEPTION: ICD-10-CM

## 2021-07-23 DIAGNOSIS — Z90.49 ACQUIRED ABSENCE OF OTHER SPECIFIED PARTS OF DIGESTIVE TRACT: Chronic | ICD-10-CM

## 2021-07-23 DIAGNOSIS — R68.89 OTHER GENERAL SYMPTOMS AND SIGNS: ICD-10-CM

## 2021-07-23 DIAGNOSIS — Z98.890 OTHER SPECIFIED POSTPROCEDURAL STATES: Chronic | ICD-10-CM

## 2021-07-23 LAB
ANION GAP SERPL CALC-SCNC: 11 MMOL/L — SIGNIFICANT CHANGE UP (ref 7–14)
BLD GP AB SCN SERPL QL: NEGATIVE — SIGNIFICANT CHANGE UP
BUN SERPL-MCNC: 16 MG/DL — SIGNIFICANT CHANGE UP (ref 7–23)
CALCIUM SERPL-MCNC: 8.9 MG/DL — SIGNIFICANT CHANGE UP (ref 8.4–10.5)
CHLORIDE SERPL-SCNC: 104 MMOL/L — SIGNIFICANT CHANGE UP (ref 98–107)
CO2 SERPL-SCNC: 25 MMOL/L — SIGNIFICANT CHANGE UP (ref 22–31)
CREAT SERPL-MCNC: 0.93 MG/DL — SIGNIFICANT CHANGE UP (ref 0.5–1.3)
GLUCOSE SERPL-MCNC: 95 MG/DL — SIGNIFICANT CHANGE UP (ref 70–99)
HCG UR QL: NEGATIVE — SIGNIFICANT CHANGE UP
HCT VFR BLD CALC: 39.2 % — SIGNIFICANT CHANGE UP (ref 34.5–45)
HGB BLD-MCNC: 12.7 G/DL — SIGNIFICANT CHANGE UP (ref 11.5–15.5)
MCHC RBC-ENTMCNC: 28.7 PG — SIGNIFICANT CHANGE UP (ref 27–34)
MCHC RBC-ENTMCNC: 32.4 GM/DL — SIGNIFICANT CHANGE UP (ref 32–36)
MCV RBC AUTO: 88.5 FL — SIGNIFICANT CHANGE UP (ref 80–100)
NRBC # BLD: 0 /100 WBCS — SIGNIFICANT CHANGE UP
NRBC # FLD: 0 K/UL — SIGNIFICANT CHANGE UP
PLATELET # BLD AUTO: 291 K/UL — SIGNIFICANT CHANGE UP (ref 150–400)
POTASSIUM SERPL-MCNC: 3.5 MMOL/L — SIGNIFICANT CHANGE UP (ref 3.5–5.3)
POTASSIUM SERPL-SCNC: 3.5 MMOL/L — SIGNIFICANT CHANGE UP (ref 3.5–5.3)
RBC # BLD: 4.43 M/UL — SIGNIFICANT CHANGE UP (ref 3.8–5.2)
RBC # FLD: 12.1 % — SIGNIFICANT CHANGE UP (ref 10.3–14.5)
RH IG SCN BLD-IMP: POSITIVE — SIGNIFICANT CHANGE UP
SODIUM SERPL-SCNC: 140 MMOL/L — SIGNIFICANT CHANGE UP (ref 135–145)
WBC # BLD: 6.29 K/UL — SIGNIFICANT CHANGE UP (ref 3.8–10.5)
WBC # FLD AUTO: 6.29 K/UL — SIGNIFICANT CHANGE UP (ref 3.8–10.5)

## 2021-07-23 RX ORDER — CYCLOBENZAPRINE HYDROCHLORIDE 10 MG/1
1 TABLET, FILM COATED ORAL
Qty: 0 | Refills: 0 | DISCHARGE

## 2021-07-23 RX ORDER — MECLIZINE HCL 12.5 MG
1 TABLET ORAL
Qty: 0 | Refills: 0 | DISCHARGE

## 2021-07-23 RX ORDER — SODIUM CHLORIDE 9 MG/ML
1000 INJECTION, SOLUTION INTRAVENOUS
Refills: 0 | Status: DISCONTINUED | OUTPATIENT
Start: 2021-08-10 | End: 2021-08-24

## 2021-07-23 NOTE — H&P PST ADULT - HISTORY OF PRESENT ILLNESS
Pt is a 40 y.o, female ; states " I do not want any more children " Pt  to surgeon ; pt now presents for Laparoscopic Bilateral salpingectomy  Pt is a 40 y.o, female ; states " I do not want any more children " Pt  to surgeon ; pt now presents for Laparoscopic Bilateral Salpingectomy

## 2021-07-23 NOTE — H&P PST ADULT - HEIGHT IN INCHES
SUBJECTIVE: Patient seen and examined. Pain controlled.    OBJECTIVE:  NAD  Vital Signs Last 24 Hrs  T(C): 36.4 (31 Oct 2019 04:35), Max: 36.9 (30 Oct 2019 20:20)  T(F): 97.6 (31 Oct 2019 04:35), Max: 98.5 (30 Oct 2019 20:20)  HR: 60 (31 Oct 2019 04:35) (56 - 74)  BP: 113/68 (31 Oct 2019 04:35) (101/55 - 136/74)  BP(mean): 77 (30 Oct 2019 13:22) (77 - 99)  RR: 16 (31 Oct 2019 04:35) (10 - 21)  SpO2: 95% (31 Oct 2019 04:35) (92% - 99%)    Affected extremity:          Dressing: clean/dry/intact            Sensation: SILT         Motor exam: 5/5 TA/GS/EHL         warm well perfused; capillary refill <3 seconds           I&O's Detail    30 Oct 2019 07:01  -  31 Oct 2019 06:29  --------------------------------------------------------  IN:    lactated ringers.: 280 mL  Total IN: 280 mL    OUT:    Voided: 750 mL  Total OUT: 750 mL    Total NET: -470 mL        A/P :  Pt is a 68yo Female s/p  R TKA  -    Pain control  -    DVT ppx: SCD/ASA     -    Weight bearing status: WBAT   -    Physical Therapy  -    Dispo: Home    Lv Kennedy MD  Senior Orthopaedic Resident  Orthopaedic Surgery Orthopaedic Surgery Post Operative Check    Procedure:  Surgeon:    Pt comfortable without complaints, pain controlled  Denies CP, SOB, N/V, numbness/tingling    Vital Signs Last 24 Hrs  T(C): 36.4 (10-30-19 @ 10:32), Max: 36.4 (10-30-19 @ 10:32)  T(F): 97.5 (10-30-19 @ 10:32), Max: 97.5 (10-30-19 @ 10:32)  HR: 56 (10-30-19 @ 12:58) (56 - 74)  BP: 119/60 (10-30-19 @ 12:58) (106/63 - 128/85)  BP(mean): 83 (10-30-19 @ 12:58) (80 - 99)  RR: 11 (10-30-19 @ 12:58) (10 - 21)  SpO2: 95% (10-30-19 @ 12:58) (93% - 99%)  AVSS    General: Pt Alert and oriented, NAD  DSG C/D/I right knee cryocuff  Pulses: DP pulse palpable RLE   Sensation: intact to bilateral lower extremities   Motor: EHL/FHL/TA/GS 5/5 bilateral lower extremities             Post-op X-Ray: prosthesis in place     A/P: 67yFemale POD#0 s/p right total knee replacement   - Stable  - Pain Control  - DVT ppx: ASA, SCDs  - Post op abx: ancef   - PT, WBS: WBAT   - f/u AM labs     Ortho Pager [phone] POST OPERATIVE DAY #: 1  STATUS POST: Right TKR                      SUBJECTIVE: Patient seen and examined. States to doing well, mild knee pain. Has mild nausea. Denies any CP, SOB, fever, chills, numbness/tingling, vomiting.     Pain:  well controlled      OBJECTIVE:     Vital Signs Last 24 Hrs  T(C): 36.5 (31 Oct 2019 08:20), Max: 36.9 (30 Oct 2019 20:20)  T(F): 97.7 (31 Oct 2019 08:20), Max: 98.5 (30 Oct 2019 20:20)  HR: 62 (31 Oct 2019 08:20) (56 - 73)  BP: 131/77 (31 Oct 2019 08:20) (101/55 - 136/74)  BP(mean): 77 (30 Oct 2019 13:22) (77 - 99)  RR: 16 (31 Oct 2019 08:20) (10 - 21)  SpO2: 97% (31 Oct 2019 08:20) (92% - 99%)    Affected extremity:          Dressing: clean/dry/intact          Sensation: intact to light touch          Motor exam:  5/5 to EHL/TA/GS         warm, well-perfused; capillary refill < 3 seconds              I&O's Detail    30 Oct 2019 07:01  -  31 Oct 2019 07:00  --------------------------------------------------------  IN:    lactated ringers.: 280 mL  Total IN: 280 mL    OUT:    Voided: 750 mL  Total OUT: 750 mL    Total NET: -470 mL          LABS:                        11.8   11.41 )-----------( 193      ( 31 Oct 2019 07:12 )             35.9     10-31    139  |  105  |  19  ----------------------------<  123<H>  4.6   |  25  |  0.65    Ca    8.7      31 Oct 2019 07:12            MEDICATIONS:    acetaminophen   Tablet .. 975 milliGRAM(s) Oral every 8 hours  ketorolac   Injectable 15 milliGRAM(s) IV Push every 6 hours  metoclopramide Injectable 10 milliGRAM(s) IV Push once  morphine  - Injectable 4 milliGRAM(s) IV Push every 4 hours PRN  morphine  - Injectable 4 milliGRAM(s) IV Push every 15 minutes PRN  ondansetron Injectable 4 milliGRAM(s) IV Push every 6 hours PRN  traMADol 50 milliGRAM(s) Oral every 4 hours PRN    aspirin enteric coated 325 milliGRAM(s) Oral two times a day      RADIOLOGY & ADDITIONAL STUDIES:     ASSESSMENT AND PLAN: s/p Right TKR    1. Analgesic pain control  2. DVT prophylaxis: ASA           SCDs        3. Continue PT  4. Reglan ordered for nausea  5. Weight Bearing Status:  Weight bearing as tolerated      6. Disposition: Home when medically and PT cleared 0

## 2021-07-23 NOTE — H&P PST ADULT - NSANTHOSAYNRD_GEN_A_CORE
No. KIMBERLEY screening performed.  STOP BANG Legend: 0-2 = LOW Risk; 3-4 = INTERMEDIATE Risk; 5-8 = HIGH Risk

## 2021-07-23 NOTE — H&P PST ADULT - NSICDXPROBLEM_GEN_ALL_CORE_FT
PROBLEM DIAGNOSES  Problem: Encounter for other general counseling and advice on contraception  Assessment and Plan: Laparoscopic Bilateral Salpingectomy   Pre op instructions reviewed with pt ; including Pepcid and Hibiclens with teach back ; pt verbalized good understanding of pre op instructions    Problem: Forgetfulness  Assessment and Plan: Recent neurological evaluation ; MRI pending        PROBLEM DIAGNOSES  Problem: Encounter for other general counseling and advice on contraception  Assessment and Plan: Laparoscopic Bilateral Salpingectomy   Pre op instructions reviewed with pt ; including Pepcid and Hibiclens with teach back ; pt verbalized good understanding of pre op instructions  Pre op Covid test per surgeon  Cup provided for UCG dos     Problem: Forgetfulness  Assessment and Plan: Recent neurological evaluation ; MRI pending
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
## 2021-07-23 NOTE — H&P PST ADULT - NSICDXPASTSURGICALHX_GEN_ALL_CORE_FT
PAST SURGICAL HISTORY:  History of endoscopy 2009 upper endoscopy,GERD diagnosed    Uterine polyp 7/2012 uterine polyp removed     PAST SURGICAL HISTORY:  History of endoscopy 2009 upper endoscopy,GERD diagnosed    S/P breast biopsy Right 2019 " benign "    S/P laparoscopic cholecystectomy 2012    Uterine polyp 7/2012 uterine polyp removed
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
## 2021-08-02 PROBLEM — M54.2 CERVICALGIA: Chronic | Status: ACTIVE | Noted: 2021-07-23

## 2021-08-02 PROBLEM — U07.1 COVID-19: Chronic | Status: ACTIVE | Noted: 2021-07-23

## 2021-08-02 PROBLEM — R41.3 OTHER AMNESIA: Chronic | Status: ACTIVE | Noted: 2021-07-23

## 2021-08-07 ENCOUNTER — APPOINTMENT (OUTPATIENT)
Dept: DISASTER EMERGENCY | Facility: CLINIC | Age: 41
End: 2021-08-07

## 2021-08-08 LAB — SARS-COV-2 N GENE NPH QL NAA+PROBE: NOT DETECTED

## 2021-08-09 ENCOUNTER — TRANSCRIPTION ENCOUNTER (OUTPATIENT)
Age: 41
End: 2021-08-09
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
## 2021-08-09 NOTE — ASU PATIENT PROFILE, ADULT - NSICDXPASTSURGICALHX_GEN_ALL_CORE_FT
PAST SURGICAL HISTORY:  History of endoscopy 2009 upper endoscopy,GERD diagnosed    S/P breast biopsy Right 2019 " benign "    S/P laparoscopic cholecystectomy 2012    Uterine polyp 7/2012 uterine polyp removed

## 2021-08-10 ENCOUNTER — RESULT REVIEW (OUTPATIENT)
Age: 41
End: 2021-08-10

## 2021-08-10 ENCOUNTER — OUTPATIENT (OUTPATIENT)
Dept: OUTPATIENT SERVICES | Facility: HOSPITAL | Age: 41
LOS: 1 days | Discharge: HOME | End: 2021-08-10
Payer: COMMERCIAL

## 2021-08-10 VITALS
WEIGHT: 166.01 LBS | OXYGEN SATURATION: 100 % | HEART RATE: 64 BPM | RESPIRATION RATE: 15 BRPM | HEIGHT: 60 IN | DIASTOLIC BLOOD PRESSURE: 88 MMHG | SYSTOLIC BLOOD PRESSURE: 119 MMHG | TEMPERATURE: 99 F

## 2021-08-10 VITALS
SYSTOLIC BLOOD PRESSURE: 117 MMHG | RESPIRATION RATE: 16 BRPM | HEART RATE: 78 BPM | OXYGEN SATURATION: 100 % | DIASTOLIC BLOOD PRESSURE: 64 MMHG

## 2021-08-10 DIAGNOSIS — Z30.09 ENCOUNTER FOR OTHER GENERAL COUNSELING AND ADVICE ON CONTRACEPTION: ICD-10-CM

## 2021-08-10 DIAGNOSIS — Z98.890 OTHER SPECIFIED POSTPROCEDURAL STATES: Chronic | ICD-10-CM

## 2021-08-10 DIAGNOSIS — Z90.49 ACQUIRED ABSENCE OF OTHER SPECIFIED PARTS OF DIGESTIVE TRACT: Chronic | ICD-10-CM

## 2021-08-10 LAB
HCG UR QL: NEGATIVE — SIGNIFICANT CHANGE UP
RH IG SCN BLD-IMP: POSITIVE — SIGNIFICANT CHANGE UP

## 2021-08-10 PROCEDURE — 88302 TISSUE EXAM BY PATHOLOGIST: CPT | Mod: 26

## 2021-08-10 RX ORDER — ACETAMINOPHEN 500 MG
3 TABLET ORAL
Qty: 0 | Refills: 0 | DISCHARGE

## 2021-08-10 RX ORDER — FENTANYL CITRATE 50 UG/ML
25 INJECTION INTRAVENOUS
Refills: 0 | Status: DISCONTINUED | OUTPATIENT
Start: 2021-08-10 | End: 2021-08-10

## 2021-08-10 RX ORDER — OXYCODONE HYDROCHLORIDE 5 MG/1
5 TABLET ORAL ONCE
Refills: 0 | Status: DISCONTINUED | OUTPATIENT
Start: 2021-08-10 | End: 2021-08-10

## 2021-08-10 RX ORDER — IBUPROFEN 200 MG
3 TABLET ORAL
Qty: 0 | Refills: 0 | DISCHARGE

## 2021-08-10 RX ORDER — ONDANSETRON 8 MG/1
4 TABLET, FILM COATED ORAL ONCE
Refills: 0 | Status: DISCONTINUED | OUTPATIENT
Start: 2021-08-10 | End: 2021-08-24

## 2021-08-10 RX ADMIN — OXYCODONE HYDROCHLORIDE 5 MILLIGRAM(S): 5 TABLET ORAL at 16:30

## 2021-08-10 NOTE — ASU DISCHARGE PLAN (ADULT/PEDIATRIC) - ASU DC SPECIAL INSTRUCTIONSFT
Please schedule a follow-up appointment with Dr. Murphy in the OBGYN office in 2 weeks for a postoperative check.

## 2021-08-10 NOTE — ASU DISCHARGE PLAN (ADULT/PEDIATRIC) - CARE PROVIDER_API CALL
Tarsha Murphy)  Obstetrics and Gynecology  [address], [address]  [place] [address]  Phone: [phone]  Fax: [phone]  Follow Up Time:

## 2021-08-10 NOTE — BRIEF OPERATIVE NOTE - OPERATION/FINDINGS
small anteverted uterus, adnexa nonpalpable bilaterally  Abdominopelvic survey: grossly normal appearing uterus, bilateral tubes and ovaries. Small posterior fibroid noted.

## 2021-08-10 NOTE — ASU DISCHARGE PLAN (ADULT/PEDIATRIC) - NURSING INSTRUCTIONS
You were given intravenous TYLENOL for pain management at 3:00 pm. Please DO NOT take any products containing TYLENOL or ACETAMINOPHEN, such as VICODIN, PERCOCET, EXCEDRIN, and any over-the-counter cold medication for the next 6 hours until 9:00 pm.

## 2021-08-10 NOTE — ASU PREOP CHECKLIST - BP NONINVASIVE DIASTOLIC (MM HG)
8 29 18 LUCENTIS TX AND REPEAT EVERY 5 WK X 2 - MILD EDEMA AT 5 WKS - IF NOT FURTHER IMPROVEMENT IN EDEMA AND VA AFTER ANOTHER ROUND OF 3 TX TO CONSIDER EXTENDING AND TRIAL OFF MEDS. 88

## 2021-08-16 LAB — SURGICAL PATHOLOGY STUDY: SIGNIFICANT CHANGE UP

## 2021-11-12 ENCOUNTER — NON-APPOINTMENT (OUTPATIENT)
Age: 41
End: 2021-11-12

## 2021-11-12 ENCOUNTER — APPOINTMENT (OUTPATIENT)
Dept: PULMONOLOGY | Facility: CLINIC | Age: 41
End: 2021-11-12
Payer: COMMERCIAL

## 2021-11-12 VITALS
OXYGEN SATURATION: 96 % | DIASTOLIC BLOOD PRESSURE: 85 MMHG | HEART RATE: 83 BPM | SYSTOLIC BLOOD PRESSURE: 129 MMHG | TEMPERATURE: 98.5 F

## 2021-11-12 PROCEDURE — 36415 COLL VENOUS BLD VENIPUNCTURE: CPT

## 2021-11-12 PROCEDURE — 71046 X-RAY EXAM CHEST 2 VIEWS: CPT

## 2021-11-12 PROCEDURE — 99205 OFFICE O/P NEW HI 60 MIN: CPT | Mod: 25

## 2021-11-14 NOTE — PROCEDURE
[FreeTextEntry1] : \par  Xray Chest 2 Views PA/Lat             Final\par \par   \par Chest x-ray PA and lateral views performed in my office today showed clear lungs, no evidence of infiltrates or pleural effusions. \par \par \par  Ordered by: ADAM SILVER       Collected/Examined: 12Nov2021 03:20PM       \par Verification Required       Stage: Final       \par  Performed at: In Office       Performed by: ADAM SILVER       Resulted: 12Nov2021 03:20PM       Last Updated: 12Nov2021 03:20PM

## 2021-11-14 NOTE — HISTORY OF PRESENT ILLNESS
[TextBox_4] : Claudia is a pleasant 41-year-old female without significant past medical history, she reports that she was sick with URI back in September of this year, she underwent repeated negative COVID PCR tests recently, she has been having persistent dry cough ever since her sickness back in September, she feels that she has chest tightness, shortness of breath, but no chest pain, fever or chills. She is an ex-smoker.

## 2021-11-14 NOTE — ASSESSMENT
[FreeTextEntry1] : Venipuncture with labs drawn in office\par Start Z-Gerald and Medrol Dosepak.\par Start Asmanex 100 mcg HFA, 2 puffs twice daily\par Return for pulmonary follow-up in 2 weeks.

## 2021-11-19 ENCOUNTER — APPOINTMENT (OUTPATIENT)
Dept: PULMONOLOGY | Facility: CLINIC | Age: 41
End: 2021-11-19
Payer: COMMERCIAL

## 2021-11-19 VITALS
DIASTOLIC BLOOD PRESSURE: 82 MMHG | SYSTOLIC BLOOD PRESSURE: 133 MMHG | HEART RATE: 74 BPM | RESPIRATION RATE: 16 BRPM | TEMPERATURE: 97.4 F | OXYGEN SATURATION: 99 %

## 2021-11-19 LAB
BASOPHILS # BLD AUTO: 0.04 K/UL
BASOPHILS NFR BLD AUTO: 0.5 %
EOSINOPHIL # BLD AUTO: 0.06 K/UL
EOSINOPHIL NFR BLD AUTO: 0.8 %
HCT VFR BLD CALC: 42.2 %
HGB BLD-MCNC: 13.6 G/DL
IMM GRANULOCYTES NFR BLD AUTO: 0.1 %
LYMPHOCYTES # BLD AUTO: 1.61 K/UL
LYMPHOCYTES NFR BLD AUTO: 21 %
MAN DIFF?: NORMAL
MCHC RBC-ENTMCNC: 29.1 PG
MCHC RBC-ENTMCNC: 32.2 GM/DL
MCV RBC AUTO: 90.4 FL
MONOCYTES # BLD AUTO: 0.47 K/UL
MONOCYTES NFR BLD AUTO: 6.1 %
NEUTROPHILS # BLD AUTO: 5.47 K/UL
NEUTROPHILS NFR BLD AUTO: 71.5 %
PLATELET # BLD AUTO: 267 K/UL
RBC # BLD: 4.67 M/UL
RBC # FLD: 12.5 %
WBC # FLD AUTO: 7.66 K/UL

## 2021-11-19 PROCEDURE — 99214 OFFICE O/P EST MOD 30 MIN: CPT

## 2021-11-19 RX ORDER — ALBUTEROL SULFATE 90 UG/1
108 (90 BASE) INHALANT RESPIRATORY (INHALATION)
Qty: 1 | Refills: 1 | Status: ACTIVE | COMMUNITY
Start: 2021-11-19 | End: 1900-01-01

## 2021-11-19 RX ORDER — AZITHROMYCIN 250 MG/1
250 TABLET, FILM COATED ORAL
Qty: 1 | Refills: 0 | Status: COMPLETED | COMMUNITY
Start: 2021-11-12 | End: 2021-11-18

## 2021-11-20 LAB
ALBUMIN SERPL ELPH-MCNC: 4.5 G/DL
ALP BLD-CCNC: 66 U/L
ALT SERPL-CCNC: 25 U/L
ANION GAP SERPL CALC-SCNC: 15 MMOL/L
AST SERPL-CCNC: 19 U/L
BILIRUB SERPL-MCNC: 0.4 MG/DL
BUN SERPL-MCNC: 12 MG/DL
CALCIUM SERPL-MCNC: 9.3 MG/DL
CHLORIDE SERPL-SCNC: 105 MMOL/L
CO2 SERPL-SCNC: 19 MMOL/L
CREAT SERPL-MCNC: 0.9 MG/DL
ERYTHROCYTE [SEDIMENTATION RATE] IN BLOOD BY WESTERGREN METHOD: 24 MM/HR
GLUCOSE SERPL-MCNC: 104 MG/DL
POTASSIUM SERPL-SCNC: 4 MMOL/L
PROT SERPL-MCNC: 7.2 G/DL
SODIUM SERPL-SCNC: 138 MMOL/L
TOTAL IGE SMQN RAST: 101 KU/L

## 2021-11-21 NOTE — HISTORY OF PRESENT ILLNESS
[TextBox_4] : Patient here for asthma f/u \par \par Patient overall feels much better; does still have cough, which now is producing some mucus \par \par Has taken Asmanex and antibiotics, but not steroids due to being sensitive to them

## 2021-12-13 ENCOUNTER — APPOINTMENT (OUTPATIENT)
Dept: NEUROLOGY | Facility: CLINIC | Age: 41
End: 2021-12-13

## 2021-12-26 LAB — SARS-COV-2 N GENE NPH QL NAA+PROBE: NOT DETECTED

## 2022-01-04 ENCOUNTER — APPOINTMENT (OUTPATIENT)
Dept: PULMONOLOGY | Facility: CLINIC | Age: 42
End: 2022-01-04

## 2022-01-13 RX ORDER — MOMETASONE FUROATE 200 UG/1
200 AEROSOL RESPIRATORY (INHALATION) TWICE DAILY
Qty: 3 | Refills: 3 | Status: ACTIVE | COMMUNITY
Start: 2022-01-13 | End: 1900-01-01

## 2022-01-21 ENCOUNTER — APPOINTMENT (OUTPATIENT)
Dept: PULMONOLOGY | Facility: CLINIC | Age: 42
End: 2022-01-21

## 2022-02-18 ENCOUNTER — APPOINTMENT (OUTPATIENT)
Dept: PULMONOLOGY | Facility: CLINIC | Age: 42
End: 2022-02-18

## 2022-07-19 ENCOUNTER — APPOINTMENT (OUTPATIENT)
Dept: ORTHOPEDIC SURGERY | Facility: CLINIC | Age: 42
End: 2022-07-19

## 2022-07-19 VITALS — WEIGHT: 163 LBS | BODY MASS INDEX: 32 KG/M2 | HEIGHT: 60 IN

## 2022-07-19 DIAGNOSIS — M79.672 PAIN IN LEFT FOOT: ICD-10-CM

## 2022-07-19 PROCEDURE — 99213 OFFICE O/P EST LOW 20 MIN: CPT

## 2022-07-20 PROBLEM — M79.672 LEFT FOOT PAIN: Noted: 2022-07-19

## 2022-07-20 NOTE — PHYSICAL EXAM
[de-identified] : Constitutional: Well-nourished, well-developed, No acute distress\par Respiratory:  Good respiratory effort, no SOB\par Psychiatric: Pleasant and normal affect, alert and oriented x3\par Skin: Clean dry and intact Left LE\par Musculoskeletal: normal except where as noted in regional exam\par \par Left Ankle:\par APPEARANCE: + Mild swelling of the anterolateral ankle and foot, no marked deformities  or malalignment\par POSITIVE TENDERNESS: ATFL, dorsum of the foot overlying the talonavicular joint\par NONTENDER: medial malleolus, lateral malleolus, tibialis posterior tendon, achilles tendon, no marked thickening of tendon, PTFL, anterior tibiofibular ligament (high ankle), sinus tarsus, deltoid ligaments, 5th metatarsal. \par RANGE OF MOTION: Mild limitation of PF and Inversion due to pain/swelling, NL DF and Eversion. \par RESISTIVE TESTING: Mild pain with resisted eversion and DF.  painless resisted  plantar flexion, and inversion. \par SPECIAL TESTS: + anterior drawer for pain without laxity, + talar tilt for pain without laxity, neg Kleiger's\par  [de-identified] : The following radiographs were ordered and read by me during this patient's visit. I reviewed each radiograph in detail with the patient and discussed the findings as highlighted below. \par \par 3 views of the left foot were obtained today that show no fracture, or dislocation. There are no degenerative changes seen. There is no malalignment. No obvious osseous abnormality. Otherwise unremarkable.\par \par

## 2022-07-20 NOTE — DISCUSSION/SUMMARY
[de-identified] : Discussed findings of today's exam and possible causes of patient's pain.  Educated patient on their most probable diagnosis of left foot sprain.  Reviewed possible courses of treatment, and we collaboratively decided best course of treatment at this time will include conservative management.  Patient is advised she has no evidence of fracture or avulsion fracture on x-ray.  She was advised that this is a likely grade 1 sprain over the proximal dorsum of the foot and mild sprain of the ATFL.  Patient advised to  over-the-counter foot/ankle compression sleeve to be worn during the day for support.  Patient will be started on a course of physical therapy to restore normal range of motion and strength as tolerated.  Patient may take oral NSAIDs as needed for pain.  Follow up as needed.  Patient appreciates and agrees with current plan.\par \par \par This note was generated using dragon medical dictation software.  A reasonable effort has been made for proofreading its contents, but typos may still remain.  If there are any questions or points of clarification needed please notify my office.\par

## 2022-09-22 ENCOUNTER — APPOINTMENT (OUTPATIENT)
Dept: ORTHOPEDIC SURGERY | Facility: CLINIC | Age: 42
End: 2022-09-22

## 2022-09-22 ENCOUNTER — APPOINTMENT (OUTPATIENT)
Dept: ORTHOPEDIC SURGERY | Facility: CLINIC | Age: 42
End: 2022-09-22
Payer: COMMERCIAL

## 2022-09-22 VITALS
BODY MASS INDEX: 31.41 KG/M2 | HEART RATE: 93 BPM | HEIGHT: 60 IN | WEIGHT: 160 LBS | SYSTOLIC BLOOD PRESSURE: 124 MMHG | DIASTOLIC BLOOD PRESSURE: 81 MMHG

## 2022-09-22 VITALS
BODY MASS INDEX: 31.41 KG/M2 | HEART RATE: 90 BPM | OXYGEN SATURATION: 98 % | SYSTOLIC BLOOD PRESSURE: 159 MMHG | WEIGHT: 160 LBS | HEIGHT: 60 IN | DIASTOLIC BLOOD PRESSURE: 93 MMHG

## 2022-09-22 DIAGNOSIS — M25.559 PAIN IN UNSPECIFIED HIP: ICD-10-CM

## 2022-09-22 DIAGNOSIS — G57.12 MERALGIA PARESTHETICA, LEFT LOWER LIMB: ICD-10-CM

## 2022-09-22 PROCEDURE — 99214 OFFICE O/P EST MOD 30 MIN: CPT | Mod: 25

## 2022-09-22 PROCEDURE — 20611 DRAIN/INJ JOINT/BURSA W/US: CPT | Mod: LT

## 2022-09-22 PROCEDURE — 73502 X-RAY EXAM HIP UNI 2-3 VIEWS: CPT | Mod: LT

## 2022-09-22 PROCEDURE — 99212 OFFICE O/P EST SF 10 MIN: CPT

## 2022-09-22 RX ORDER — PREDNISONE 50 MG/1
50 TABLET ORAL DAILY
Qty: 5 | Refills: 0 | Status: ACTIVE | COMMUNITY
Start: 2022-09-22 | End: 1900-01-01

## 2022-09-23 PROBLEM — G57.12 MERALGIA PARESTHETICA OF LEFT SIDE: Status: ACTIVE | Noted: 2022-09-23

## 2022-09-23 NOTE — DISCUSSION/SUMMARY
[de-identified] : Discussed findings of today's exam and possible causes of patient's pain.  Educated patient on their most probable diagnosis of Left meralgia paresthetica.  Reviewed possible courses of treatment, and we collaboratively decided best course of treatment at this time will include conservative management.  Patient is having significant symptoms, she is requesting an injection, she is advised that she would benefit from consultation with my sports physiatrist associated with specializes in these procedures for cortisone injection/Hydro dissection of the lateral femoral cutaneous nerve.  Patient appreciates and agrees with current plan.\par \par \par This note was generated using dragon medical dictation software.  A reasonable effort has been made for proofreading its contents, but typos may still remain.  If there are any questions or points of clarification needed please notify my office.

## 2022-09-23 NOTE — HISTORY OF PRESENT ILLNESS
[de-identified] : Patient is here for left thigh pain which began about 2 weeks ago.  Patient was traveling [facility], she is dealing with a sick family member on hospice, she does not recall any injury or trauma.  However, she spent many hours in the car and then noticed the onset of this left-sided low back, groin and thigh pain.  Patient went for a deep tissue massage which helped alleviate a lot of her low back discomfort, but she continues to have pain in the anterior and lateral thigh region.  She describes the pain as a burning sensation.

## 2022-09-23 NOTE — PHYSICAL EXAM
[de-identified] : Constitutional: Well-nourished, well-developed, No acute distress\par Respiratory:  Good respiratory effort, no SOB\par Lymphatic: No regional lymphadenopathy, no lymphedema\par Psychiatric: Pleasant and normal affect, alert and oriented x3\par Musculoskeletal: normal except where as noted in regional exam\par \par Left hip:\par \par APPEARANCE: no marked deformities, no swelling or malalignment\par POSITIVE TENDERNESS: None\par NONTENDER: greater trochanter, TFL, gluteal region, ischium/proximal hamstring region, hip flexor region, sartorius and pubic symphysis. \par ROM: full & painless. \par RESISTIVE TESTING: painless resisted flex/ext, ER/IR/SLR/abduction/adduction. \par SPECIAL TESTS: neg ADRIEN/FADIR, painless loaded flexion & scouring. neg hop test & fulcrum test\par Neuro:  + Decreased sensation of the anterolateral thigh in the distribution of the lateral femoral cutaneous nerve, otherwise NL sensation of lower extremity

## 2022-10-14 ENCOUNTER — APPOINTMENT (OUTPATIENT)
Dept: INTERNAL MEDICINE | Facility: CLINIC | Age: 42
End: 2022-10-14
Payer: COMMERCIAL

## 2022-10-14 ENCOUNTER — NON-APPOINTMENT (OUTPATIENT)
Age: 42
End: 2022-10-14

## 2022-10-14 VITALS
TEMPERATURE: 98.1 F | RESPIRATION RATE: 12 BRPM | HEART RATE: 94 BPM | SYSTOLIC BLOOD PRESSURE: 128 MMHG | OXYGEN SATURATION: 97 % | DIASTOLIC BLOOD PRESSURE: 82 MMHG

## 2022-10-14 VITALS — HEIGHT: 60 IN | WEIGHT: 160 LBS | BODY MASS INDEX: 31.41 KG/M2

## 2022-10-14 DIAGNOSIS — R42 DIZZINESS AND GIDDINESS: ICD-10-CM

## 2022-10-14 DIAGNOSIS — S93.402A SPRAIN OF UNSPECIFIED LIGAMENT OF LEFT ANKLE, INITIAL ENCOUNTER: ICD-10-CM

## 2022-10-14 DIAGNOSIS — Z87.898 PERSONAL HISTORY OF OTHER SPECIFIED CONDITIONS: ICD-10-CM

## 2022-10-14 DIAGNOSIS — S93.602A UNSPECIFIED SPRAIN OF LEFT FOOT, INITIAL ENCOUNTER: ICD-10-CM

## 2022-10-14 DIAGNOSIS — E66.9 OBESITY, UNSPECIFIED: ICD-10-CM

## 2022-10-14 DIAGNOSIS — M62.838 OTHER MUSCLE SPASM: ICD-10-CM

## 2022-10-14 DIAGNOSIS — R39.9 UNSPECIFIED SYMPTOMS AND SIGNS INVOLVING THE GENITOURINARY SYSTEM: ICD-10-CM

## 2022-10-14 DIAGNOSIS — R22.0 LOCALIZED SWELLING, MASS AND LUMP, HEAD: ICD-10-CM

## 2022-10-14 DIAGNOSIS — Z23 ENCOUNTER FOR IMMUNIZATION: ICD-10-CM

## 2022-10-14 DIAGNOSIS — F41.9 ANXIETY DISORDER, UNSPECIFIED: ICD-10-CM

## 2022-10-14 DIAGNOSIS — R68.89 OTHER GENERAL SYMPTOMS AND SIGNS: ICD-10-CM

## 2022-10-14 DIAGNOSIS — Z01.812 ENCOUNTER FOR PREPROCEDURAL LABORATORY EXAMINATION: ICD-10-CM

## 2022-10-14 DIAGNOSIS — R07.89 OTHER CHEST PAIN: ICD-10-CM

## 2022-10-14 DIAGNOSIS — Z00.00 ENCOUNTER FOR GENERAL ADULT MEDICAL EXAMINATION W/OUT ABNORMAL FINDINGS: ICD-10-CM

## 2022-10-14 DIAGNOSIS — L84 CORNS AND CALLOSITIES: ICD-10-CM

## 2022-10-14 DIAGNOSIS — Z87.39 PERSONAL HISTORY OF OTHER DISEASES OF THE MUSCULOSKELETAL SYSTEM AND CONNECTIVE TISSUE: ICD-10-CM

## 2022-10-14 DIAGNOSIS — Z01.818 ENCOUNTER FOR OTHER PREPROCEDURAL EXAMINATION: ICD-10-CM

## 2022-10-14 PROCEDURE — 36415 COLL VENOUS BLD VENIPUNCTURE: CPT

## 2022-10-14 PROCEDURE — 99396 PREV VISIT EST AGE 40-64: CPT | Mod: 25

## 2022-10-14 PROCEDURE — 93000 ELECTROCARDIOGRAM COMPLETE: CPT | Mod: 59

## 2022-10-14 PROCEDURE — G0008: CPT

## 2022-10-14 PROCEDURE — 90686 IIV4 VACC NO PRSV 0.5 ML IM: CPT

## 2022-10-14 PROCEDURE — G0447 BEHAVIOR COUNSEL OBESITY 15M: CPT

## 2022-10-14 RX ORDER — MECLIZINE HYDROCHLORIDE 12.5 MG/1
12.5 TABLET ORAL 3 TIMES DAILY
Qty: 90 | Refills: 0 | Status: ACTIVE | COMMUNITY
Start: 2021-06-11 | End: 1900-01-01

## 2022-10-14 RX ORDER — METHYLPREDNISOLONE 4 MG/1
4 TABLET ORAL
Qty: 16 | Refills: 0 | Status: COMPLETED | COMMUNITY
Start: 2021-11-12 | End: 2022-10-14

## 2022-10-14 NOTE — HEALTH RISK ASSESSMENT
[Very Good] : ~his/her~  mood as very good [Former] : Former [Yes] : Yes [Monthly or less (1 pt)] : Monthly or less (1 point) [1 or 2 (0 pts)] : 1 or 2 (0 points) [Never (0 pts)] : Never (0 points) [No] : In the past 12 months have you used drugs other than those required for medical reasons? No [No falls in past year] : Patient reported no falls in the past year [Patient reported PAP Smear was normal] : Patient reported PAP Smear was normal [HIV test declined] : HIV test declined [Hepatitis C test declined] : Hepatitis C test declined [None] : None [Employed] : employed [Single] : single [Sexually Active] : sexually active [Feels Safe at Home] : Feels safe at home [Fully functional (bathing, dressing, toileting, transferring, walking, feeding)] : Fully functional (bathing, dressing, toileting, transferring, walking, feeding) [Fully functional (using the telephone, shopping, preparing meals, housekeeping, doing laundry, using] : Fully functional and needs no help or supervision to perform IADLs (using the telephone, shopping, preparing meals, housekeeping, doing laundry, using transportation, managing medications and managing finances) [Reports changes in hearing] : Reports changes in hearing [Reports normal functional visual acuity (ie: able to read med bottle)] : Reports normal functional visual acuity [Smoke Detector] : smoke detector [Carbon Monoxide Detector] : carbon monoxide detector [Safety elements used in home] : safety elements used in home [Seat Belt] :  uses seat belt [Sunscreen] : uses sunscreen [0] : 2) Feeling down, depressed, or hopeless: Not at all (0) [PHQ-2 Negative - No further assessment needed] : PHQ-2 Negative - No further assessment needed [Patient reported mammogram was normal] : Patient reported mammogram was normal [de-identified] : 5-9 [YearQuit] : 2012 [Audit-CScore] : 1 [de-identified] : not exercising presently [[id number]] : 0 [Change in mental status noted] : No change in mental status noted [Language] : denies difficulty with language [Behavior] : denies difficulty with behavior [Learning/Retaining New Information] : denies difficulty learning/retaining new information [Handling Complex Tasks] : denies difficulty handling complex tasks [Reasoning] : denies difficulty with reasoning [Spatial Ability and Orientation] : denies difficulty with spatial ability and orientation [High Risk Behavior] : no high risk behavior [Reports changes in vision] : Reports no changes in vision [Reports changes in dental health] : Reports no changes in dental health [TB Exposure] : is not being exposed to tuberculosis [MammogramDate] : 07/21 [MammogramComments] : BIRADS1 [PapSmearDate] : 04/2021 [PapSmearComments] : has appt with gyn later this month [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

## 2022-10-14 NOTE — PHYSICAL EXAM
[Declined Breast Exam] : declined breast exam  [No CVA Tenderness] : no CVA  tenderness [No Spinal Tenderness] : no spinal tenderness [Kyphosis] : no kyphosis [Scoliosis] : no scoliosis [Coordination Grossly Intact] : coordination grossly intact [No Focal Deficits] : no focal deficits [Normal Gait] : normal gait [Deep Tendon Reflexes (DTR)] : deep tendon reflexes were 2+ and symmetric [Normal] : affect was normal and insight and judgment were intact [de-identified] : up to date with gynecologist [de-identified] : no bladder fullness or tenderness
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
## 2022-10-14 NOTE — PLAN
[FreeTextEntry1] : -Healthy diet and exercise reviewed\par -Back pain with radiculopathy--following with ortho, declines need for workup/imaging as states she will be discussing with Dr. Sarmiento\par -Asthma well controlled, off meds presently.\par Immunizations: Flu today; COVID19 booster recommended, Tdap--pt to check records\par PAP smear-has upcoming appt with gyn\par Labs drawn in office today, to call for results\par Moving to [place], to f/u yearly if has not established care with new provider.

## 2022-10-14 NOTE — PAST MEDICAL HISTORY
[Menstruating] : menstruating [Regular Cycle Intervals] : have been regular [Total Preg ___] : G[unfilled] [Live Births ___] : P[unfilled]  [Full Term ___] : Full Term: [unfilled] [Abortions ___] : Abortions:[unfilled] [Definite ___ (Date)] : the last menstrual period was [unfilled] [FreeTextEntry1] : q28 days; became more frequent after recent steroid shot--q21 days.

## 2022-10-14 NOTE — COUNSELING
[Benefits of weight loss discussed] : Benefits of weight loss discussed [Encouraged to increase physical activity] : Encouraged to increase physical activity [Weigh Self Weekly] : weigh self weekly [Decrease Portions] : decrease portions [____ min/wk Activity] : [unfilled] min/wk activity [Good understanding] : Patient has a good understanding of disease, goals and obesity follow-up plan [FreeTextEntry4] : 150

## 2022-10-21 ENCOUNTER — APPOINTMENT (OUTPATIENT)
Dept: ULTRASOUND IMAGING | Facility: CLINIC | Age: 42
End: 2022-10-21

## 2022-10-21 ENCOUNTER — APPOINTMENT (OUTPATIENT)
Dept: MAMMOGRAPHY | Facility: CLINIC | Age: 42
End: 2022-10-21

## 2022-10-27 ENCOUNTER — APPOINTMENT (OUTPATIENT)
Dept: ULTRASOUND IMAGING | Facility: CLINIC | Age: 42
End: 2022-10-27

## 2022-10-27 ENCOUNTER — OUTPATIENT (OUTPATIENT)
Dept: OUTPATIENT SERVICES | Facility: HOSPITAL | Age: 42
LOS: 1 days | End: 2022-10-27
Payer: COMMERCIAL

## 2022-10-27 ENCOUNTER — APPOINTMENT (OUTPATIENT)
Dept: MAMMOGRAPHY | Facility: CLINIC | Age: 42
End: 2022-10-27

## 2022-10-27 DIAGNOSIS — Z00.8 ENCOUNTER FOR OTHER GENERAL EXAMINATION: ICD-10-CM

## 2022-10-27 DIAGNOSIS — R92.2 INCONCLUSIVE MAMMOGRAM: ICD-10-CM

## 2022-10-27 DIAGNOSIS — Z12.31 ENCOUNTER FOR SCREENING MAMMOGRAM FOR MALIGNANT NEOPLASM OF BREAST: ICD-10-CM

## 2022-10-27 DIAGNOSIS — Z90.49 ACQUIRED ABSENCE OF OTHER SPECIFIED PARTS OF DIGESTIVE TRACT: Chronic | ICD-10-CM

## 2022-10-27 DIAGNOSIS — Z98.890 OTHER SPECIFIED POSTPROCEDURAL STATES: Chronic | ICD-10-CM

## 2022-10-27 PROCEDURE — 76641 ULTRASOUND BREAST COMPLETE: CPT | Mod: 26,50

## 2022-10-27 PROCEDURE — 77063 BREAST TOMOSYNTHESIS BI: CPT | Mod: 26

## 2022-10-27 PROCEDURE — 77067 SCR MAMMO BI INCL CAD: CPT

## 2022-10-27 PROCEDURE — 77063 BREAST TOMOSYNTHESIS BI: CPT

## 2022-10-27 PROCEDURE — 77067 SCR MAMMO BI INCL CAD: CPT | Mod: 26

## 2022-10-27 PROCEDURE — 76641 ULTRASOUND BREAST COMPLETE: CPT

## 2022-12-07 DIAGNOSIS — E55.9 VITAMIN D DEFICIENCY, UNSPECIFIED: ICD-10-CM

## 2022-12-07 LAB
25(OH)D3 SERPL-MCNC: 14 NG/ML
ALBUMIN SERPL ELPH-MCNC: 4.9 G/DL
ALP BLD-CCNC: 84 U/L
ALT SERPL-CCNC: 16 U/L
ANION GAP SERPL CALC-SCNC: 12 MMOL/L
AST SERPL-CCNC: 17 U/L
BASOPHILS # BLD AUTO: 0.03 K/UL
BASOPHILS NFR BLD AUTO: 0.5 %
BILIRUB SERPL-MCNC: 0.4 MG/DL
BUN SERPL-MCNC: 15 MG/DL
C TRACH RRNA SPEC QL NAA+PROBE: NOT DETECTED
CALCIUM SERPL-MCNC: 9.6 MG/DL
CHLORIDE SERPL-SCNC: 103 MMOL/L
CHOLEST SERPL-MCNC: 269 MG/DL
CO2 SERPL-SCNC: 22 MMOL/L
CREAT SERPL-MCNC: 0.82 MG/DL
EGFR: 91 ML/MIN/1.73M2
EOSINOPHIL # BLD AUTO: 0.13 K/UL
EOSINOPHIL NFR BLD AUTO: 2.2 %
ESTIMATED AVERAGE GLUCOSE: 111 MG/DL
GLUCOSE SERPL-MCNC: 103 MG/DL
HBA1C MFR BLD HPLC: 5.5 %
HBV CORE IGG+IGM SER QL: NONREACTIVE
HBV SURFACE AB SER QL: REACTIVE
HBV SURFACE AG SER QL: NONREACTIVE
HCT VFR BLD CALC: 42.2 %
HCV AB SER QL: NONREACTIVE
HCV S/CO RATIO: 0.1 S/CO
HDLC SERPL-MCNC: 51 MG/DL
HGB BLD-MCNC: 13.7 G/DL
HIV1+2 AB SPEC QL IA.RAPID: NONREACTIVE
HSV 1+2 IGG SER IA-IMP: NEGATIVE
HSV 1+2 IGG SER IA-IMP: POSITIVE
HSV1 IGG SER QL: 3.41 INDEX
HSV1 IGM SER QL: NEGATIVE
HSV2 AB FLD-ACNC: NEGATIVE
HSV2 IGG SER QL: 0.06 INDEX
IMM GRANULOCYTES NFR BLD AUTO: 0.2 %
LDLC SERPL CALC-MCNC: 172 MG/DL
LYMPHOCYTES # BLD AUTO: 1.52 K/UL
LYMPHOCYTES NFR BLD AUTO: 25.6 %
MAN DIFF?: NORMAL
MCHC RBC-ENTMCNC: 29 PG
MCHC RBC-ENTMCNC: 32.5 GM/DL
MCV RBC AUTO: 89.4 FL
MONOCYTES # BLD AUTO: 0.57 K/UL
MONOCYTES NFR BLD AUTO: 9.6 %
N GONORRHOEA RRNA SPEC QL NAA+PROBE: NOT DETECTED
NEUTROPHILS # BLD AUTO: 3.67 K/UL
NEUTROPHILS NFR BLD AUTO: 61.9 %
NONHDLC SERPL-MCNC: 218 MG/DL
PLATELET # BLD AUTO: 348 K/UL
POTASSIUM SERPL-SCNC: 4.3 MMOL/L
PROT SERPL-MCNC: 7.7 G/DL
RBC # BLD: 4.72 M/UL
RBC # FLD: 12.3 %
SODIUM SERPL-SCNC: 138 MMOL/L
SOURCE AMPLIFICATION: NORMAL
T PALLIDUM AB SER QL IA: NEGATIVE
TRIGL SERPL-MCNC: 231 MG/DL
TSH SERPL-ACNC: 2.86 UIU/ML
WBC # FLD AUTO: 5.93 K/UL

## 2022-12-07 RX ORDER — ERGOCALCIFEROL 1.25 MG/1
1.25 MG CAPSULE ORAL
Qty: 13 | Refills: 0 | Status: ACTIVE | COMMUNITY
Start: 2022-12-07 | End: 1900-01-01

## 2023-06-16 ENCOUNTER — APPOINTMENT (OUTPATIENT)
Dept: CT IMAGING | Facility: CLINIC | Age: 43
End: 2023-06-16
Payer: COMMERCIAL

## 2023-06-16 PROCEDURE — 74177 CT ABD & PELVIS W/CONTRAST: CPT

## 2023-06-17 ENCOUNTER — APPOINTMENT (OUTPATIENT)
Dept: CT IMAGING | Facility: CLINIC | Age: 43
End: 2023-06-17

## 2023-08-10 NOTE — HISTORY OF PRESENT ILLNESS
[FreeTextEntry1] : Patient is a 38-year-old female who is seen in consultation at the request of Dr Ham regarding a mass on her face. Patient reports that the mass has been present for a year now, and has slowly grown in size. Denies any trauma to area or similar lesions. Patient does admit trying to press on it, and noted discharge with foul odor. Denies any bleeding. Reports occasional discomfort. Denies itchiness. Denies history of skin cancer.\par No family history of skin cancer.\par 
[TextEntry] : ROS is negative except as per HPI.

## 2023-08-11 ENCOUNTER — APPOINTMENT (OUTPATIENT)
Dept: PULMONOLOGY | Facility: CLINIC | Age: 43
End: 2023-08-11
Payer: COMMERCIAL

## 2023-08-11 VITALS — HEART RATE: 88 BPM | DIASTOLIC BLOOD PRESSURE: 80 MMHG | OXYGEN SATURATION: 98 % | SYSTOLIC BLOOD PRESSURE: 118 MMHG

## 2023-08-11 DIAGNOSIS — J45.909 UNSPECIFIED ASTHMA, UNCOMPLICATED: ICD-10-CM

## 2023-08-11 DIAGNOSIS — J98.11 ATELECTASIS: ICD-10-CM

## 2023-08-11 PROCEDURE — 99214 OFFICE O/P EST MOD 30 MIN: CPT | Mod: 25

## 2023-08-11 PROCEDURE — 95012 NITRIC OXIDE EXP GAS DETER: CPT

## 2023-08-11 PROCEDURE — 94010 BREATHING CAPACITY TEST: CPT

## 2023-08-11 PROCEDURE — 94727 GAS DIL/WSHOT DETER LNG VOL: CPT

## 2023-08-11 PROCEDURE — 94729 DIFFUSING CAPACITY: CPT

## 2023-08-11 PROCEDURE — ZZZZZ: CPT

## 2023-08-11 NOTE — ADDENDUM
[FreeTextEntry1] : I, Barney Rose, acted solely as a scribe for Dr. Leigh Wallace D.O., on this date 08/11/2023.   All medical record entries made by the Scribe were at my, Dr. Leigh Wallcae D.O., direction and personally dictated by me on 08/11/2023. I have reviewed the chart and agree that the record accurately reflects my personal performance of the history, physical exam, assessment and plan. I have also personally directed, reviewed, and agreed with the chart.

## 2023-08-11 NOTE — DISCUSSION/SUMMARY
[FreeTextEntry1] : Ms. KASHIF ISLAS is an 42 year old female, history of asthma. Patient has dyspnea likely secondary to

## 2023-08-11 NOTE — ADDENDUM
[FreeTextEntry1] : I, Barney Rose, acted solely as a scribe for Dr. Leigh Wallace D.O., on this date 08/11/2023.   All medical record entries made by the Scribe were at my, Dr. Leigh Wallace D.O., direction and personally dictated by me on 08/11/2023. I have reviewed the chart and agree that the record accurately reflects my personal performance of the history, physical exam, assessment and plan. I have also personally directed, reviewed, and agreed with the chart.

## 2023-08-12 ENCOUNTER — OUTPATIENT (OUTPATIENT)
Dept: OUTPATIENT SERVICES | Facility: HOSPITAL | Age: 43
LOS: 1 days | End: 2023-08-12
Payer: COMMERCIAL

## 2023-08-12 ENCOUNTER — APPOINTMENT (OUTPATIENT)
Dept: CT IMAGING | Facility: CLINIC | Age: 43
End: 2023-08-12
Payer: COMMERCIAL

## 2023-08-12 DIAGNOSIS — J98.11 ATELECTASIS: ICD-10-CM

## 2023-08-12 DIAGNOSIS — Z90.49 ACQUIRED ABSENCE OF OTHER SPECIFIED PARTS OF DIGESTIVE TRACT: Chronic | ICD-10-CM

## 2023-08-12 PROCEDURE — 71250 CT THORAX DX C-: CPT

## 2023-08-12 PROCEDURE — 71250 CT THORAX DX C-: CPT | Mod: 26

## 2023-08-12 PROCEDURE — 95800 SLP STDY UNATTENDED: CPT | Mod: 52

## 2023-08-13 PROCEDURE — 95800 SLP STDY UNATTENDED: CPT | Mod: 52

## 2023-08-13 NOTE — PROCEDURE
[FreeTextEntry1] : Pulmonary Function Test performed in my office today: Spirometry: Within normal limits; Lung Volume: Within normal limits; Diffusion: Within normal limits. __________  Exhaled Nitric Oxide             Final  No Documents Attached    	Test  	Result  	Flag	Reference	Goal	Last Verified  	Exhaled Nitric Oxide	15	 	 		REQUIRED  Ordered by: ADAM SILVER       Collected/Examined: 11Aug2023 01:56PM       Verification Required       Stage: Final       Performed at: In Office       Performed by: ADAM SILVER       Resulted: 11Aug2023 01:56PM       Last Updated: 11Aug2023 01:56PM

## 2023-08-13 NOTE — REVIEW OF SYSTEMS
[Recent Wt Gain (___ Lbs)] : ~T recent [unfilled] lb weight gain [SOB on Exertion] : sob on exertion [TextBox_30] : Snoring [Cough] : cough [Negative] : Endocrine

## 2023-08-13 NOTE — ASSESSMENT
[FreeTextEntry1] : Venipuncture with labs drawn in office Obtained and reviewed pulmonary function test, NIOX, chest x-ray results with patient today. Ordered chest CT scan for further pulmonary evaluation of atelectasis indicated in recent abdomen CT scan. Albuterol HFA as needed for shortness of breath. Based on history and physical exam the patient has a high likelihood of having obstructive sleep apnea. Further assessment by sleep testing is recommended. There is no contraindication to a home sleep study. We will therefore proceed to two night home sleep apnea study for further assessment.  Return for sleep follow up 2 weeks after dropping off home sleep study device.

## 2023-08-13 NOTE — REASON FOR VISIT
[TextBox_44] : Dyspnea on exertion [Follow-Up] : a follow-up visit [Cough] : cough [Asthma] : asthma [Hypersomnolence] : hypersomnolence

## 2023-08-25 ENCOUNTER — APPOINTMENT (OUTPATIENT)
Dept: PULMONOLOGY | Facility: CLINIC | Age: 43
End: 2023-08-25
Payer: COMMERCIAL

## 2023-08-25 DIAGNOSIS — R06.83 SNORING: ICD-10-CM

## 2023-08-25 DIAGNOSIS — G47.33 OBSTRUCTIVE SLEEP APNEA (ADULT) (PEDIATRIC): ICD-10-CM

## 2023-08-25 PROCEDURE — 99214 OFFICE O/P EST MOD 30 MIN: CPT | Mod: 95

## 2023-08-25 NOTE — ASSESSMENT
[FreeTextEntry1] : Ms. KASHIF ISLAS is an 42 year old female with mild obstructive sleep apnea indicated in recent home sleep study.  Persistent exertional dyspnea of unclear etiology, rule out physical deconditioning, etc.  Explained to the patient that her pulmonary work-up for dyspnea has been completely normal and unremarkable. Advised patient to receive a Cardiopulmonary exercise test to elucidate the possible etiology of her dyspnea..  Results of sleep study reviewed with patient. Treatment options including weight loss, oral appliance therapy, and PAP therapy were reviewed with patient. After careful review of sleep study patient desire and risk factors recommend initial therapy with PAP on 5-20 cmH2O. Will order an auto titrating device with nasal mask.  Follow up 1 month after receiving device for data download and compliance assessment.      Time spent in telehealth consultation, charting and care coordination is 35 minutes.

## 2023-08-25 NOTE — PROCEDURE
[FreeTextEntry1] : Home sleep study results done on 08/12/2023-08/13/2023  Mild obstructive sleep apnea  Overall AHI: 12

## 2023-08-25 NOTE — HISTORY OF PRESENT ILLNESS
[FreeTextEntry1] : KASHIF ISLAS is a 42 year old female, with history of asthma, who presents to the office for follow up evaluation of sleep apnea. Patient here to review results of home sleep study. No changes in history as reported on initial evaluation.  Still complains of persistent exertional dyspnea.

## 2023-08-25 NOTE — ADDENDUM
[FreeTextEntry1] : I, Barney Rose, acted solely as a scribe for Dr. Leigh Wallace D.O., on this date 08/25/2023.   All medical record entries made by the Scribe were at my, Dr. Leigh Wallace D.O., direction and personally dictated by me on 08/25/2023. I have reviewed the chart and agree that the record accurately reflects my personal performance of the history, physical exam, assessment and plan. I have also personally directed, reviewed, and agreed with the chart.

## 2023-08-25 NOTE — REASON FOR VISIT
[Home] : at home, [unfilled] , at the time of the visit. [Medical Office: ([facility])___] : at the medical office located in  [Patient] : the patient [Sleep Apnea] : sleep apnea [FreeTextEntry1] : Telehealth [FreeTextEntry2] : Home sleep study results

## 2023-08-25 NOTE — REVIEW OF SYSTEMS
[Negative] : Psychiatric [Fatigue] : fatigue [Shortness Of Breath] : shortness of breath [FreeTextEntry8] : Exertional dyspnea

## 2023-08-26 LAB
ANION GAP SERPL CALC-SCNC: 13 MMOL/L
BUN SERPL-MCNC: 13 MG/DL
CALCIUM SERPL-MCNC: 8.8 MG/DL
CHLORIDE SERPL-SCNC: 105 MMOL/L
CO2 SERPL-SCNC: 21 MMOL/L
CREAT SERPL-MCNC: 1.13 MG/DL
DEPRECATED D DIMER PPP IA-ACNC: <150 NG/ML DDU
EGFR: 62 ML/MIN/1.73M2
ERYTHROCYTE [SEDIMENTATION RATE] IN BLOOD BY WESTERGREN METHOD: 5 MM/HR
GLUCOSE SERPL-MCNC: 88 MG/DL
POTASSIUM SERPL-SCNC: 4.2 MMOL/L
SODIUM SERPL-SCNC: 139 MMOL/L

## 2023-11-02 ENCOUNTER — APPOINTMENT (OUTPATIENT)
Dept: MAMMOGRAPHY | Facility: CLINIC | Age: 43
End: 2023-11-02
Payer: COMMERCIAL

## 2023-11-02 ENCOUNTER — APPOINTMENT (OUTPATIENT)
Dept: ULTRASOUND IMAGING | Facility: CLINIC | Age: 43
End: 2023-11-02
Payer: COMMERCIAL

## 2023-11-02 PROCEDURE — 77067 SCR MAMMO BI INCL CAD: CPT

## 2023-11-02 PROCEDURE — 76641 ULTRASOUND BREAST COMPLETE: CPT | Mod: 50

## 2023-11-02 PROCEDURE — 77063 BREAST TOMOSYNTHESIS BI: CPT

## 2023-11-03 ENCOUNTER — OUTPATIENT (OUTPATIENT)
Dept: OUTPATIENT SERVICES | Facility: HOSPITAL | Age: 43
LOS: 1 days | End: 2023-11-03
Payer: COMMERCIAL

## 2023-11-03 ENCOUNTER — APPOINTMENT (OUTPATIENT)
Dept: ULTRASOUND IMAGING | Facility: CLINIC | Age: 43
End: 2023-11-03
Payer: COMMERCIAL

## 2023-11-03 DIAGNOSIS — Z90.49 ACQUIRED ABSENCE OF OTHER SPECIFIED PARTS OF DIGESTIVE TRACT: Chronic | ICD-10-CM

## 2023-11-03 DIAGNOSIS — Z98.890 OTHER SPECIFIED POSTPROCEDURAL STATES: Chronic | ICD-10-CM

## 2023-11-03 DIAGNOSIS — Z00.00 ENCOUNTER FOR GENERAL ADULT MEDICAL EXAMINATION WITHOUT ABNORMAL FINDINGS: ICD-10-CM

## 2023-11-03 PROCEDURE — 76830 TRANSVAGINAL US NON-OB: CPT

## 2023-11-03 PROCEDURE — 76830 TRANSVAGINAL US NON-OB: CPT | Mod: 26

## 2023-11-27 ENCOUNTER — APPOINTMENT (OUTPATIENT)
Dept: PULMONOLOGY | Facility: CLINIC | Age: 43
End: 2023-11-27
Payer: COMMERCIAL

## 2023-11-27 DIAGNOSIS — R05.3 CHRONIC COUGH: ICD-10-CM

## 2023-11-27 DIAGNOSIS — R06.00 DYSPNEA, UNSPECIFIED: ICD-10-CM

## 2023-11-27 DIAGNOSIS — R09.82 POSTNASAL DRIP: ICD-10-CM

## 2023-11-27 PROCEDURE — 99214 OFFICE O/P EST MOD 30 MIN: CPT | Mod: 95

## 2023-11-27 RX ORDER — FLUTICASONE PROPIONATE 50 UG/1
50 SPRAY, METERED NASAL
Qty: 3 | Refills: 11 | Status: ACTIVE | COMMUNITY
Start: 2023-11-27 | End: 1900-01-01

## 2023-11-27 RX ORDER — AMOXICILLIN AND CLAVULANATE POTASSIUM 875; 125 MG/1; MG/1
875-125 TABLET, COATED ORAL
Qty: 14 | Refills: 0 | Status: COMPLETED | COMMUNITY
Start: 2023-11-27 | End: 2023-12-04

## 2023-11-27 RX ORDER — PREDNISONE 10 MG/1
10 TABLET ORAL
Qty: 18 | Refills: 0 | Status: ACTIVE | COMMUNITY
Start: 2023-11-27 | End: 1900-01-01

## 2024-01-29 ENCOUNTER — TRANSCRIPTION ENCOUNTER (OUTPATIENT)
Age: 44
End: 2024-01-29

## 2024-02-07 ENCOUNTER — TRANSCRIPTION ENCOUNTER (OUTPATIENT)
Age: 44
End: 2024-02-07

## 2025-08-20 ENCOUNTER — APPOINTMENT (OUTPATIENT)
Dept: ULTRASOUND IMAGING | Facility: CLINIC | Age: 45
End: 2025-08-20
Payer: COMMERCIAL

## 2025-08-20 ENCOUNTER — APPOINTMENT (OUTPATIENT)
Dept: MAMMOGRAPHY | Facility: CLINIC | Age: 45
End: 2025-08-20
Payer: COMMERCIAL

## 2025-08-20 PROCEDURE — 77063 BREAST TOMOSYNTHESIS BI: CPT

## 2025-08-20 PROCEDURE — 77067 SCR MAMMO BI INCL CAD: CPT

## 2025-08-20 PROCEDURE — 76641 ULTRASOUND BREAST COMPLETE: CPT | Mod: 50
